# Patient Record
Sex: FEMALE | Race: WHITE | URBAN - METROPOLITAN AREA
[De-identification: names, ages, dates, MRNs, and addresses within clinical notes are randomized per-mention and may not be internally consistent; named-entity substitution may affect disease eponyms.]

---

## 2021-03-12 ENCOUNTER — LAB OUTSIDE AN ENCOUNTER (OUTPATIENT)
Dept: URBAN - METROPOLITAN AREA CLINIC 113 | Facility: CLINIC | Age: 56
End: 2021-03-12

## 2021-03-12 ENCOUNTER — WEB ENCOUNTER (OUTPATIENT)
Dept: URBAN - METROPOLITAN AREA CLINIC 113 | Facility: CLINIC | Age: 56
End: 2021-03-12

## 2021-03-12 ENCOUNTER — OFFICE VISIT (OUTPATIENT)
Dept: URBAN - METROPOLITAN AREA CLINIC 113 | Facility: CLINIC | Age: 56
End: 2021-03-12
Payer: COMMERCIAL

## 2021-03-12 ENCOUNTER — TELEPHONE ENCOUNTER (OUTPATIENT)
Dept: URBAN - METROPOLITAN AREA CLINIC 113 | Facility: CLINIC | Age: 56
End: 2021-03-12

## 2021-03-12 VITALS
RESPIRATION RATE: 20 BRPM | WEIGHT: 169 LBS | DIASTOLIC BLOOD PRESSURE: 72 MMHG | TEMPERATURE: 96.9 F | HEIGHT: 72 IN | HEART RATE: 64 BPM | BODY MASS INDEX: 22.89 KG/M2 | SYSTOLIC BLOOD PRESSURE: 107 MMHG

## 2021-03-12 DIAGNOSIS — K58.1 IRRITABLE BOWEL SYNDROME WITH CONSTIPATION: ICD-10-CM

## 2021-03-12 DIAGNOSIS — K59.01 SLOW TRANSIT CONSTIPATION: ICD-10-CM

## 2021-03-12 DIAGNOSIS — Z86.010 PERSONAL HISTORY OF COLONIC POLYPS: ICD-10-CM

## 2021-03-12 DIAGNOSIS — K21.9 GASTROESOPHAGEAL REFLUX DISEASE WITHOUT ESOPHAGITIS: ICD-10-CM

## 2021-03-12 DIAGNOSIS — Z80.0 FAMILY HISTORY OF COLON CANCER IN MOTHER: ICD-10-CM

## 2021-03-12 PROCEDURE — 99204 OFFICE O/P NEW MOD 45 MIN: CPT | Performed by: INTERNAL MEDICINE

## 2021-03-12 RX ORDER — MIRABEGRON 50 MG/1
1 TABLET TABLET, FILM COATED, EXTENDED RELEASE ORAL ONCE A DAY
Status: ACTIVE | COMMUNITY

## 2021-03-12 RX ORDER — PANTOPRAZOLE SODIUM 40 MG/1
1 TABLET TABLET, DELAYED RELEASE ORAL ONCE A DAY
Qty: 90 | Refills: 3 | OUTPATIENT
Start: 2021-03-12

## 2021-03-12 RX ORDER — VIT A/VIT C/VIT E/ZINC/COPPER 4296-226
AS DIRECTED CAPSULE ORAL TWICE DAILY
Status: ACTIVE | COMMUNITY

## 2021-03-12 RX ORDER — LORAZEPAM 2 MG/1
1 TABLET AT BEDTIME AS NEEDED TABLET ORAL ONCE A DAY
Status: ACTIVE | COMMUNITY

## 2021-03-12 RX ORDER — TRAZODONE HYDROCHLORIDE 100 MG/1
1 TABLET AT BEDTIME TABLET, FILM COATED ORAL ONCE A DAY
Status: ACTIVE | COMMUNITY

## 2021-03-12 RX ORDER — GLUCOSAMINE/CHONDR SU A SOD 750-600 MG
1 CAPSULE TABLET ORAL ONCE A DAY
Status: ACTIVE | COMMUNITY

## 2021-03-12 RX ORDER — VITAMIN A 10000 UNIT
1 TABLET TABLET ORAL ONCE A DAY
Status: ACTIVE | COMMUNITY

## 2021-03-12 NOTE — HPI-OTHER HISTORIES
55-year-old female presents with a history of gastroesophageal reflux disease and significant constipation.  She goes 7 days without having a bowel movement.  She has significant abdominal bloating.  She has been seen by multiple gastroenterologists in [place] and in our area.  Previously she has seen Dr. James.  She also saw Dr. Mcmahan and also saw Dr. Phillips.  She has a known history of anxiety.  She also has a history of esophageal spasm.  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  She has interstitial cystitis.  She does not have diarrhea symptoms.  On 2 different occasions in the past celiac testing was negative serologically.  She has intermittent chest pain.  At times she has to go to the ER.  One episode of severe abdominal pain occurred about one and a half years ago.  She had a CT scan at the [facility] and was told that she had diverticulitis.  Last colonoscopy was 2-1/2 years ago.  Prior colonoscopies were notable for adenomatous polyps.  There is no history of significant nausea or vomiting.  No rectal bleeding at this time.  No melena.  She does not have significant dysphagia.  She does have periods of esophageal chest pain.  There is no pre-existing history of liver disease.  Currently she is on MiraLAX and taking pantoprazole.  With pantoprazole she states that her gastroesophageal reflux disease symptoms are now much improved.  Previously she had consider the option of antireflux surgery.  This is when she saw Dr. Mcmahan.

## 2021-03-22 ENCOUNTER — TELEPHONE ENCOUNTER (OUTPATIENT)
Dept: URBAN - METROPOLITAN AREA CLINIC 113 | Facility: CLINIC | Age: 56
End: 2021-03-22

## 2021-03-22 RX ORDER — LINACLOTIDE 145 UG/1
1 CAPSULE AT LEAST 30 MINUTES BEFORE THE FIRST MEAL OF THE DAY ON AN EMPTY STOMACH CAPSULE, GELATIN COATED ORAL ONCE A DAY
Qty: 90 | Refills: 3 | OUTPATIENT
Start: 2021-03-22 | End: 2022-03-17

## 2021-03-31 ENCOUNTER — OFFICE VISIT (OUTPATIENT)
Dept: URBAN - METROPOLITAN AREA MEDICAL CENTER 19 | Facility: MEDICAL CENTER | Age: 56
End: 2021-03-31
Payer: COMMERCIAL

## 2021-03-31 ENCOUNTER — TELEPHONE ENCOUNTER (OUTPATIENT)
Dept: URBAN - METROPOLITAN AREA CLINIC 113 | Facility: CLINIC | Age: 56
End: 2021-03-31

## 2021-03-31 DIAGNOSIS — R07.89 ACUTE CHEST WALL PAIN: ICD-10-CM

## 2021-03-31 DIAGNOSIS — B96.81 H. PYLORI INFECTION: ICD-10-CM

## 2021-03-31 DIAGNOSIS — K29.50 ANTRAL GASTRITIS: ICD-10-CM

## 2021-03-31 DIAGNOSIS — K44.9 DIAPHRAGMATIC HERNIA: ICD-10-CM

## 2021-03-31 PROCEDURE — 43239 EGD BIOPSY SINGLE/MULTIPLE: CPT | Performed by: INTERNAL MEDICINE

## 2021-03-31 RX ORDER — FAMOTIDINE 40 MG/1
1 TABLET AT BEDTIME TABLET, FILM COATED ORAL ONCE A DAY
Qty: 90 | Refills: 3 | OUTPATIENT
Start: 2021-03-31

## 2021-03-31 RX ORDER — MIRABEGRON 50 MG/1
1 TABLET TABLET, FILM COATED, EXTENDED RELEASE ORAL ONCE A DAY
Status: ACTIVE | COMMUNITY

## 2021-03-31 RX ORDER — LORAZEPAM 2 MG/1
1 TABLET AT BEDTIME AS NEEDED TABLET ORAL ONCE A DAY
Status: ACTIVE | COMMUNITY

## 2021-03-31 RX ORDER — LINACLOTIDE 145 UG/1
1 CAPSULE AT LEAST 30 MINUTES BEFORE THE FIRST MEAL OF THE DAY ON AN EMPTY STOMACH CAPSULE, GELATIN COATED ORAL ONCE A DAY
Qty: 90 | Refills: 3 | Status: ACTIVE | COMMUNITY
Start: 2021-03-22 | End: 2022-03-17

## 2021-03-31 RX ORDER — TRAZODONE HYDROCHLORIDE 100 MG/1
1 TABLET AT BEDTIME TABLET, FILM COATED ORAL ONCE A DAY
Status: ACTIVE | COMMUNITY

## 2021-03-31 RX ORDER — GLUCOSAMINE/CHONDR SU A SOD 750-600 MG
1 CAPSULE TABLET ORAL ONCE A DAY
Status: ACTIVE | COMMUNITY

## 2021-03-31 RX ORDER — PANTOPRAZOLE SODIUM 40 MG/1
1 TABLET TABLET, DELAYED RELEASE ORAL ONCE A DAY
Qty: 90 | Refills: 3 | Status: ACTIVE | COMMUNITY
Start: 2021-03-12

## 2021-03-31 RX ORDER — VIT A/VIT C/VIT E/ZINC/COPPER 4296-226
AS DIRECTED CAPSULE ORAL TWICE DAILY
Status: ACTIVE | COMMUNITY

## 2021-03-31 RX ORDER — VITAMIN A 10000 UNIT
1 TABLET TABLET ORAL ONCE A DAY
Status: ACTIVE | COMMUNITY

## 2021-04-01 ENCOUNTER — OFFICE VISIT (OUTPATIENT)
Dept: URBAN - METROPOLITAN AREA SURGERY CENTER 25 | Facility: SURGERY CENTER | Age: 56
End: 2021-04-01

## 2021-04-07 ENCOUNTER — TELEPHONE ENCOUNTER (OUTPATIENT)
Dept: URBAN - METROPOLITAN AREA CLINIC 113 | Facility: CLINIC | Age: 56
End: 2021-04-07

## 2021-04-07 RX ORDER — LINACLOTIDE 145 UG/1
1 CAPSULE AT LEAST 30 MINUTES BEFORE THE FIRST MEAL OF THE DAY ON AN EMPTY STOMACH CAPSULE, GELATIN COATED ORAL ONCE A DAY
Qty: 90 | Refills: 3 | Status: ACTIVE | COMMUNITY
Start: 2021-03-22 | End: 2022-03-17

## 2021-04-07 RX ORDER — VITAMIN A 10000 UNIT
1 TABLET TABLET ORAL ONCE A DAY
Status: ACTIVE | COMMUNITY

## 2021-04-07 RX ORDER — AMOXICILLIN 500 MG/1
2 CAPSULES TABLET, FILM COATED ORAL TWICE A DAY
Qty: 56 CAPSULE | Refills: 0 | OUTPATIENT
Start: 2021-04-07 | End: 2021-04-21

## 2021-04-07 RX ORDER — MIRABEGRON 50 MG/1
1 TABLET TABLET, FILM COATED, EXTENDED RELEASE ORAL ONCE A DAY
Status: ACTIVE | COMMUNITY

## 2021-04-07 RX ORDER — LORAZEPAM 2 MG/1
1 TABLET AT BEDTIME AS NEEDED TABLET ORAL ONCE A DAY
Status: ACTIVE | COMMUNITY

## 2021-04-07 RX ORDER — TRAZODONE HYDROCHLORIDE 100 MG/1
1 TABLET AT BEDTIME TABLET, FILM COATED ORAL ONCE A DAY
Status: ACTIVE | COMMUNITY

## 2021-04-07 RX ORDER — VIT A/VIT C/VIT E/ZINC/COPPER 4296-226
AS DIRECTED CAPSULE ORAL TWICE DAILY
Status: ACTIVE | COMMUNITY

## 2021-04-07 RX ORDER — CLARITHROMYCIN 500 MG/1
1 TABLET TABLET, FILM COATED ORAL
Qty: 28 TABLET | Refills: 0 | OUTPATIENT
Start: 2021-04-07 | End: 2021-04-21

## 2021-04-07 RX ORDER — GLUCOSAMINE/CHONDR SU A SOD 750-600 MG
1 CAPSULE TABLET ORAL ONCE A DAY
Status: ACTIVE | COMMUNITY

## 2021-04-07 RX ORDER — PANTOPRAZOLE SODIUM 40 MG/1
1 TABLET TABLET, DELAYED RELEASE ORAL ONCE A DAY
Qty: 90 | Refills: 3 | Status: ACTIVE | COMMUNITY
Start: 2021-03-12

## 2021-04-07 RX ORDER — FAMOTIDINE 40 MG/1
1 TABLET AT BEDTIME TABLET, FILM COATED ORAL ONCE A DAY
Qty: 90 | Refills: 3 | Status: ACTIVE | COMMUNITY
Start: 2021-03-31

## 2021-04-07 RX ORDER — PANTOPRAZOLE SODIUM 40 MG/1
1 TABLET TABLET, DELAYED RELEASE ORAL TWICE DAILY
Qty: 60 | Refills: 6 | OUTPATIENT
Start: 2021-04-07

## 2021-05-07 ENCOUNTER — TELEPHONE ENCOUNTER (OUTPATIENT)
Dept: URBAN - METROPOLITAN AREA CLINIC 113 | Facility: CLINIC | Age: 56
End: 2021-05-07

## 2021-05-14 ENCOUNTER — LAB OUTSIDE AN ENCOUNTER (OUTPATIENT)
Dept: URBAN - METROPOLITAN AREA CLINIC 113 | Facility: CLINIC | Age: 56
End: 2021-05-14

## 2021-05-14 ENCOUNTER — OFFICE VISIT (OUTPATIENT)
Dept: URBAN - METROPOLITAN AREA CLINIC 113 | Facility: CLINIC | Age: 56
End: 2021-05-14
Payer: COMMERCIAL

## 2021-05-14 ENCOUNTER — WEB ENCOUNTER (OUTPATIENT)
Dept: URBAN - METROPOLITAN AREA CLINIC 113 | Facility: CLINIC | Age: 56
End: 2021-05-14

## 2021-05-14 VITALS
TEMPERATURE: 97.1 F | HEART RATE: 66 BPM | BODY MASS INDEX: 21.59 KG/M2 | HEIGHT: 72 IN | DIASTOLIC BLOOD PRESSURE: 73 MMHG | RESPIRATION RATE: 20 BRPM | WEIGHT: 159.4 LBS | SYSTOLIC BLOOD PRESSURE: 123 MMHG

## 2021-05-14 DIAGNOSIS — K59.01 SLOW TRANSIT CONSTIPATION: ICD-10-CM

## 2021-05-14 DIAGNOSIS — A04.8 HELICOBACTER PYLORI (H. PYLORI): ICD-10-CM

## 2021-05-14 DIAGNOSIS — Z86.010 PERSONAL HISTORY OF COLONIC POLYPS: ICD-10-CM

## 2021-05-14 DIAGNOSIS — E61.1 IRON DEFICIENCY: ICD-10-CM

## 2021-05-14 DIAGNOSIS — K21.9 GASTROESOPHAGEAL REFLUX DISEASE WITHOUT ESOPHAGITIS: ICD-10-CM

## 2021-05-14 DIAGNOSIS — K22.4 ESOPHAGEAL DYSMOTILITY: ICD-10-CM

## 2021-05-14 DIAGNOSIS — R10.12 LEFT UPPER QUADRANT ABDOMINAL PAIN: ICD-10-CM

## 2021-05-14 DIAGNOSIS — R10.32 LEFT LOWER QUADRANT ABDOMINAL PAIN: ICD-10-CM

## 2021-05-14 DIAGNOSIS — Z80.0 FAMILY HISTORY OF COLON CANCER IN MOTHER: ICD-10-CM

## 2021-05-14 DIAGNOSIS — K58.1 IRRITABLE BOWEL SYNDROME WITH CONSTIPATION: ICD-10-CM

## 2021-05-14 PROCEDURE — 99214 OFFICE O/P EST MOD 30 MIN: CPT | Performed by: INTERNAL MEDICINE

## 2021-05-14 RX ORDER — VIT A/VIT C/VIT E/ZINC/COPPER 4296-226
AS DIRECTED CAPSULE ORAL TWICE DAILY
Status: ACTIVE | COMMUNITY

## 2021-05-14 RX ORDER — GLUCOSAMINE/CHONDR SU A SOD 750-600 MG
1 CAPSULE TABLET ORAL ONCE A DAY
Status: ACTIVE | COMMUNITY

## 2021-05-14 RX ORDER — TRAZODONE HYDROCHLORIDE 100 MG/1
1 TABLET AT BEDTIME TABLET, FILM COATED ORAL ONCE A DAY
Status: ACTIVE | COMMUNITY

## 2021-05-14 RX ORDER — PANTOPRAZOLE SODIUM 40 MG/1
1 TABLET TABLET, DELAYED RELEASE ORAL ONCE A DAY
Qty: 90 | Refills: 3 | Status: ACTIVE | COMMUNITY
Start: 2021-03-12

## 2021-05-14 RX ORDER — LORAZEPAM 2 MG/1
1 TABLET AT BEDTIME AS NEEDED TABLET ORAL ONCE A DAY
Status: ACTIVE | COMMUNITY

## 2021-05-14 RX ORDER — LINACLOTIDE 145 UG/1
1 CAPSULE AT LEAST 30 MINUTES BEFORE THE FIRST MEAL OF THE DAY ON AN EMPTY STOMACH CAPSULE, GELATIN COATED ORAL ONCE A DAY
Qty: 90 | Refills: 3 | Status: ACTIVE | COMMUNITY
Start: 2021-03-22 | End: 2022-03-17

## 2021-05-14 RX ORDER — MIRABEGRON 50 MG/1
1 TABLET TABLET, FILM COATED, EXTENDED RELEASE ORAL ONCE A DAY
Status: ACTIVE | COMMUNITY

## 2021-05-14 RX ORDER — VITAMIN A 10000 UNIT
1 TABLET TABLET ORAL ONCE A DAY
Status: ACTIVE | COMMUNITY

## 2021-05-14 RX ORDER — PANTOPRAZOLE SODIUM 40 MG/1
1 TABLET TABLET, DELAYED RELEASE ORAL TWICE DAILY
Qty: 60 | Refills: 6 | Status: ACTIVE | COMMUNITY
Start: 2021-04-07

## 2021-05-14 RX ORDER — FAMOTIDINE 40 MG/1
1 TABLET AT BEDTIME TABLET, FILM COATED ORAL ONCE A DAY
Qty: 90 | Refills: 3 | Status: ACTIVE | COMMUNITY
Start: 2021-03-31

## 2021-05-14 NOTE — HPI-OTHER HISTORIES
55-year-old female presents with a history of gastroesophageal reflux disease and significant constipation.  She has been seen by multiple gastroenterologists in [place] and in our area.  Previously she has seen Dr. James.  She also saw Dr. Mcmahan and also saw Dr. Phillips.  She has a known history of anxiety.  She also has a history of esophageal spasm.  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  She has interstitial cystitis.    On 2 different occasions in the past celiac testing was negative serologically.  She has intermittent chest pain.  At times she has to go to the ER.  One episode of severe abdominal pain occurred about one and a half years ago.  She had a CT scan at the [facility] and [place] and was told that she had diverticulitis.  Last colonoscopy was 2-1/2 years ago.  Prior colonoscopies were notable for adenomatous polyps.  Previously she had consider the option of antireflux surgery.  This is when she saw Dr. Mcmahan.  . Since her last visit she was treated for H. pylori.  Reflux symptoms are now much better on pantoprazole twice daily and famotidine at bedtime.  Her bowel reflux he is improved on a combination of Linzess daily and MiraLAX every third day.  She tells me that occasionally she uses coffee enemas to control constipation.  She has difficulty with colon preps.  She states that the MiraLAX prep works best for her.  She is not having difficulty with swallowing at this time.  He is interested in confirming eradication of H. pylori.  Apparently she was treated for H. pylori 20 years ago. . EGD March 31st 2021.  Small hiatal hernia and patulous lower esophageal sphincter.  Mild nonerosive gastritis.  Biopsies were positive for H. pylori. . Barium swallow March 2021.  Consistent with esophageal dysmotility. . EGD 20 years ago.  Reportedly notable for H. pylori on biopsies.  She was treated with quadruple therapy at that time

## 2021-05-28 ENCOUNTER — TELEPHONE ENCOUNTER (OUTPATIENT)
Dept: URBAN - METROPOLITAN AREA CLINIC 113 | Facility: CLINIC | Age: 56
End: 2021-05-28

## 2021-06-03 ENCOUNTER — OFFICE VISIT (OUTPATIENT)
Dept: URBAN - METROPOLITAN AREA CLINIC 113 | Facility: CLINIC | Age: 56
End: 2021-06-03

## 2021-06-03 ENCOUNTER — TELEPHONE ENCOUNTER (OUTPATIENT)
Dept: URBAN - METROPOLITAN AREA CLINIC 113 | Facility: CLINIC | Age: 56
End: 2021-06-03

## 2021-06-03 RX ORDER — SUCRALFATE 1 G/10ML
10 ML - 20 ML SUSPENSION ORAL
Qty: 1000 ML | Refills: 11 | OUTPATIENT
Start: 2021-06-03 | End: 2022-05-29

## 2021-06-19 LAB
H PYLORI BREATH TEST: NOT DETECTED
H. PYLORI BREATH TEST: NOT DETECTED
INTERPRETATION: NOT DETECTED

## 2021-06-24 ENCOUNTER — OFFICE VISIT (OUTPATIENT)
Dept: URBAN - METROPOLITAN AREA CLINIC 113 | Facility: CLINIC | Age: 56
End: 2021-06-24
Payer: COMMERCIAL

## 2021-06-24 VITALS
HEIGHT: 72 IN | SYSTOLIC BLOOD PRESSURE: 120 MMHG | TEMPERATURE: 97.7 F | BODY MASS INDEX: 22.08 KG/M2 | WEIGHT: 163 LBS | DIASTOLIC BLOOD PRESSURE: 78 MMHG | HEART RATE: 62 BPM

## 2021-06-24 DIAGNOSIS — K58.1 IRRITABLE BOWEL SYNDROME WITH CONSTIPATION: ICD-10-CM

## 2021-06-24 DIAGNOSIS — R10.12 LEFT UPPER QUADRANT ABDOMINAL PAIN: ICD-10-CM

## 2021-06-24 DIAGNOSIS — A04.8 HELICOBACTER PYLORI (H. PYLORI): ICD-10-CM

## 2021-06-24 DIAGNOSIS — Z86.010 PERSONAL HISTORY OF COLONIC POLYPS: ICD-10-CM

## 2021-06-24 DIAGNOSIS — E61.1 IRON DEFICIENCY: ICD-10-CM

## 2021-06-24 DIAGNOSIS — Z80.0 FAMILY HISTORY OF COLON CANCER IN MOTHER: ICD-10-CM

## 2021-06-24 DIAGNOSIS — K22.4 ESOPHAGEAL DYSMOTILITY: ICD-10-CM

## 2021-06-24 DIAGNOSIS — K59.01 SLOW TRANSIT CONSTIPATION: ICD-10-CM

## 2021-06-24 DIAGNOSIS — K21.9 GASTROESOPHAGEAL REFLUX DISEASE WITHOUT ESOPHAGITIS: ICD-10-CM

## 2021-06-24 DIAGNOSIS — R10.32 LEFT LOWER QUADRANT ABDOMINAL PAIN: ICD-10-CM

## 2021-06-24 PROCEDURE — 99214 OFFICE O/P EST MOD 30 MIN: CPT | Performed by: INTERNAL MEDICINE

## 2021-06-24 RX ORDER — TRAZODONE HYDROCHLORIDE 100 MG/1
1 TABLET AT BEDTIME TABLET, FILM COATED ORAL ONCE A DAY
Status: ACTIVE | COMMUNITY

## 2021-06-24 RX ORDER — PANTOPRAZOLE SODIUM 40 MG/1
1 TABLET TABLET, DELAYED RELEASE ORAL ONCE A DAY
Qty: 90 | Refills: 3 | Status: ON HOLD | COMMUNITY
Start: 2021-03-12

## 2021-06-24 RX ORDER — SUCRALFATE 1 G/10ML
10 ML - 20 ML SUSPENSION ORAL
Qty: 1000 ML | Refills: 11 | Status: ON HOLD | COMMUNITY
Start: 2021-06-03 | End: 2022-05-29

## 2021-06-24 RX ORDER — FAMOTIDINE 40 MG/1
1 TABLET AT BEDTIME TABLET, FILM COATED ORAL ONCE A DAY
Qty: 90 | Refills: 3 | Status: ACTIVE | COMMUNITY
Start: 2021-03-31

## 2021-06-24 RX ORDER — VIT A/VIT C/VIT E/ZINC/COPPER 4296-226
AS DIRECTED CAPSULE ORAL TWICE DAILY
Status: ACTIVE | COMMUNITY

## 2021-06-24 RX ORDER — MIRABEGRON 50 MG/1
1 TABLET TABLET, FILM COATED, EXTENDED RELEASE ORAL ONCE A DAY
Status: ON HOLD | COMMUNITY

## 2021-06-24 RX ORDER — LINACLOTIDE 145 UG/1
1 CAPSULE AT LEAST 30 MINUTES BEFORE THE FIRST MEAL OF THE DAY ON AN EMPTY STOMACH CAPSULE, GELATIN COATED ORAL ONCE A DAY
Qty: 90 | Refills: 3 | Status: ACTIVE | COMMUNITY
Start: 2021-03-22 | End: 2022-03-17

## 2021-06-24 RX ORDER — PANTOPRAZOLE SODIUM 40 MG/1
1 TABLET TABLET, DELAYED RELEASE ORAL TWICE DAILY
Qty: 60 | Refills: 6 | Status: ON HOLD | COMMUNITY
Start: 2021-04-07

## 2021-06-24 RX ORDER — LORAZEPAM 2 MG/1
1 TABLET AT BEDTIME AS NEEDED TABLET ORAL ONCE A DAY
Status: ACTIVE | COMMUNITY

## 2021-06-24 RX ORDER — VITAMIN A 10000 UNIT
1 TABLET TABLET ORAL ONCE A DAY
Status: ACTIVE | COMMUNITY

## 2021-06-24 RX ORDER — GLUCOSAMINE/CHONDR SU A SOD 750-600 MG
1 CAPSULE TABLET ORAL ONCE A DAY
Status: ACTIVE | COMMUNITY

## 2021-06-24 NOTE — HPI-OTHER HISTORIES
55-year-old female presents with a history of gastroesophageal reflux disease and significant constipation.  She has been seen by multiple gastroenterologists in [place] and in our area.  Previously she has seen Dr. James.  She also saw Dr. Mcmahan and also saw Dr. Phillips.  She has a known history of anxiety.  She also has a history of esophageal spasm.  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  She has interstitial cystitis.    She has intermittent chest pain.  At times she has to go to the ER.  One episode of severe abdominal pain occurred about one and a half years ago.  She had a CT scan at the [facility] and was told that she had diverticulitis.  Last colonoscopy was 2-1/2 years ago.  Prior colonoscopies were notable for adenomatous polyps.  Previously she had consider the option of antireflux surgery.  This is when she saw Dr. Mcmahan.  . Long discussion with patient today after functional GI issues.  . She is not having difficulty with swallowing at this time.  He is interested in confirming eradication of H. pylori.  Apparently she was treated for H. pylori 20 years ago.  . HP breath Test May 2021 negative . CT Abd and Pelvis May 2021 Normal.  . EGD March 31st 2021.  Small hiatal hernia and patulous lower esophageal sphincter.  Mild nonerosive gastritis.  Biopsies were positive for H. pylori. . Barium swallow March 2021.  Consistent with esophageal dysmotility. . EGD 20 years ago.  Reportedly notable for H. pylori on biopsies.  She was treated with quadruple therapy at that time  . On 2 different occasions in the past celiac testing was negative serologically.   . Esophageal manometry 2017: Reportedly Negative for spasm.

## 2021-07-20 ENCOUNTER — OFFICE VISIT (OUTPATIENT)
Dept: URBAN - METROPOLITAN AREA CLINIC 113 | Facility: CLINIC | Age: 56
End: 2021-07-20

## 2021-07-23 ENCOUNTER — OFFICE VISIT (OUTPATIENT)
Dept: URBAN - METROPOLITAN AREA CLINIC 113 | Facility: CLINIC | Age: 56
End: 2021-07-23
Payer: COMMERCIAL

## 2021-07-23 VITALS
BODY MASS INDEX: 22.48 KG/M2 | HEART RATE: 61 BPM | DIASTOLIC BLOOD PRESSURE: 78 MMHG | TEMPERATURE: 96.9 F | HEIGHT: 72 IN | RESPIRATION RATE: 20 BRPM | SYSTOLIC BLOOD PRESSURE: 128 MMHG | WEIGHT: 166 LBS

## 2021-07-23 DIAGNOSIS — K22.4 ESOPHAGEAL DYSMOTILITY: ICD-10-CM

## 2021-07-23 DIAGNOSIS — R10.32 LEFT LOWER QUADRANT ABDOMINAL PAIN: ICD-10-CM

## 2021-07-23 DIAGNOSIS — K58.1 IRRITABLE BOWEL SYNDROME WITH CONSTIPATION: ICD-10-CM

## 2021-07-23 DIAGNOSIS — Z86.010 PERSONAL HISTORY OF COLONIC POLYPS: ICD-10-CM

## 2021-07-23 DIAGNOSIS — E61.1 IRON DEFICIENCY: ICD-10-CM

## 2021-07-23 DIAGNOSIS — R10.12 LEFT UPPER QUADRANT ABDOMINAL PAIN: ICD-10-CM

## 2021-07-23 DIAGNOSIS — A04.8 HELICOBACTER PYLORI (H. PYLORI): ICD-10-CM

## 2021-07-23 DIAGNOSIS — K21.9 GASTROESOPHAGEAL REFLUX DISEASE WITHOUT ESOPHAGITIS: ICD-10-CM

## 2021-07-23 DIAGNOSIS — K59.01 SLOW TRANSIT CONSTIPATION: ICD-10-CM

## 2021-07-23 DIAGNOSIS — Z80.0 FAMILY HISTORY OF COLON CANCER IN MOTHER: ICD-10-CM

## 2021-07-23 PROCEDURE — 99214 OFFICE O/P EST MOD 30 MIN: CPT | Performed by: INTERNAL MEDICINE

## 2021-07-23 RX ORDER — VIT A/VIT C/VIT E/ZINC/COPPER 4296-226
AS DIRECTED CAPSULE ORAL TWICE DAILY
Status: ACTIVE | COMMUNITY

## 2021-07-23 RX ORDER — SUCRALFATE 1 G/10ML
10 ML - 20 ML SUSPENSION ORAL
Qty: 1000 ML | Refills: 11 | Status: ON HOLD | COMMUNITY
Start: 2021-06-03 | End: 2022-05-29

## 2021-07-23 RX ORDER — LORAZEPAM 2 MG/1
1 TABLET AT BEDTIME AS NEEDED TABLET ORAL ONCE A DAY
Status: ACTIVE | COMMUNITY

## 2021-07-23 RX ORDER — TRAZODONE HYDROCHLORIDE 100 MG/1
1 TABLET AT BEDTIME TABLET, FILM COATED ORAL ONCE A DAY
Status: ACTIVE | COMMUNITY

## 2021-07-23 RX ORDER — FAMOTIDINE 40 MG/1
1 TABLET AT BEDTIME TABLET, FILM COATED ORAL ONCE A DAY
Qty: 90 | Refills: 3 | Status: ACTIVE | COMMUNITY
Start: 2021-03-31

## 2021-07-23 RX ORDER — PANTOPRAZOLE SODIUM 40 MG/1
1 TABLET TABLET, DELAYED RELEASE ORAL ONCE A DAY
Qty: 90 | Refills: 3 | Status: ON HOLD | COMMUNITY
Start: 2021-03-12

## 2021-07-23 RX ORDER — LINACLOTIDE 145 UG/1
1 CAPSULE AT LEAST 30 MINUTES BEFORE THE FIRST MEAL OF THE DAY ON AN EMPTY STOMACH CAPSULE, GELATIN COATED ORAL ONCE A DAY
Qty: 90 | Refills: 3 | Status: ACTIVE | COMMUNITY
Start: 2021-03-22 | End: 2022-03-17

## 2021-07-23 RX ORDER — PANTOPRAZOLE SODIUM 40 MG/1
1 TABLET TABLET, DELAYED RELEASE ORAL TWICE DAILY
Qty: 60 | Refills: 6 | Status: ON HOLD | COMMUNITY
Start: 2021-04-07

## 2021-07-23 RX ORDER — MIRABEGRON 50 MG/1
1 TABLET TABLET, FILM COATED, EXTENDED RELEASE ORAL ONCE A DAY
Status: ON HOLD | COMMUNITY

## 2021-07-23 RX ORDER — VITAMIN A 10000 UNIT
1 TABLET TABLET ORAL ONCE A DAY
Status: ACTIVE | COMMUNITY

## 2021-07-23 RX ORDER — GLUCOSAMINE/CHONDR SU A SOD 750-600 MG
1 CAPSULE TABLET ORAL ONCE A DAY
Status: ACTIVE | COMMUNITY

## 2021-07-23 NOTE — PHYSICAL EXAM EXTREMITIES:
no clubbing, cyanosis, or edema Mohs Rapid Report Verbiage: The area of clinically evident tumor was marked with skin marking ink and appropriately hatched.  The initial incision was made following the Mohs approach through the skin.  The specimen was taken to the lab, divided into the necessary number of pieces, chromacoded and processed according to the Mohs protocol.  This was repeated in successive stages until a tumor free defect was achieved.

## 2021-07-23 NOTE — HPI-OTHER HISTORIES
55-year-old female presents with a history of gastroesophageal reflux disease and significant constipation.  She has been seen by multiple gastroenterologists in [place] and in our area.  Previously she has seen Dr. James.  She also saw Dr. Mcmahan and also saw Dr. Phillips.  She has a known history of anxiety.  She also has a history of esophageal spasm.  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  She has interstitial cystitis.    She has intermittent chest pain.  At times she has to go to the ER.  One episode of severe abdominal pain occurred about one and a half years ago.  She had [facility] CT [facility] at [facility] and was told that she had diverticulitis.  Last colonoscopy was 2-1/2 years ago.  Prior colonoscopies were notable for adenomatous polyps.  Previously she had consider the option of antireflux surgery.  This is when she saw Dr. Mcmahan.  . Long discussion with patient today after functional GI disorders.  was with her for today's discussion.  She has significant visceral hypersensitivity.  Symptoms seem to be much better at this stage.  Less anxiety. . She is not having difficulty with swallowing at this time.  He is interested in confirming eradication of H. pylori.  Apparently she was treated for H. pylori 20 years ago.  . CT Abd and Pelvis with contrast June 2021.  . HP breath Test May 2021 negative . CT Abd and Pelvis May 2021 Normal.  . EGD March 31st 2021.  Small hiatal hernia and patulous lower esophageal sphincter.  Mild nonerosive gastritis.  Biopsies were positive for H. pylori. . Barium swallow March 2021.  Consistent with esophageal dysmotility. . EGD 20 years ago.  Reportedly notable for H. pylori on biopsies.  She was treated with quadruple therapy at that time  . On 2 different occasions in the past celiac testing was negative serologically.   . Esophageal manometry 2017: Reportedly Negative for spasm. But then she states she had Nutcracker esophagus.

## 2021-12-03 ENCOUNTER — TELEPHONE ENCOUNTER (OUTPATIENT)
Dept: URBAN - METROPOLITAN AREA CLINIC 6 | Facility: CLINIC | Age: 56
End: 2021-12-03

## 2021-12-21 ENCOUNTER — LAB OUTSIDE AN ENCOUNTER (OUTPATIENT)
Dept: URBAN - METROPOLITAN AREA CLINIC 113 | Facility: CLINIC | Age: 56
End: 2021-12-21

## 2021-12-27 ENCOUNTER — TELEPHONE ENCOUNTER (OUTPATIENT)
Dept: URBAN - METROPOLITAN AREA CLINIC 113 | Facility: CLINIC | Age: 56
End: 2021-12-27

## 2021-12-27 ENCOUNTER — CLAIMS CREATED FROM THE CLAIM WINDOW (OUTPATIENT)
Dept: URBAN - METROPOLITAN AREA CLINIC 113 | Facility: CLINIC | Age: 56
End: 2021-12-27
Payer: COMMERCIAL

## 2021-12-27 ENCOUNTER — LAB OUTSIDE AN ENCOUNTER (OUTPATIENT)
Dept: URBAN - METROPOLITAN AREA CLINIC 113 | Facility: CLINIC | Age: 56
End: 2021-12-27

## 2021-12-27 VITALS
BODY MASS INDEX: 21.81 KG/M2 | TEMPERATURE: 97.3 F | SYSTOLIC BLOOD PRESSURE: 115 MMHG | HEART RATE: 63 BPM | WEIGHT: 161 LBS | HEIGHT: 72 IN | DIASTOLIC BLOOD PRESSURE: 80 MMHG

## 2021-12-27 DIAGNOSIS — E61.1 IRON DEFICIENCY: ICD-10-CM

## 2021-12-27 DIAGNOSIS — K59.01 SLOW TRANSIT CONSTIPATION: ICD-10-CM

## 2021-12-27 DIAGNOSIS — A04.8 HELICOBACTER PYLORI (H. PYLORI): ICD-10-CM

## 2021-12-27 DIAGNOSIS — K58.1 IRRITABLE BOWEL SYNDROME WITH CONSTIPATION: ICD-10-CM

## 2021-12-27 DIAGNOSIS — Z86.010 PERSONAL HISTORY OF COLONIC POLYPS: ICD-10-CM

## 2021-12-27 DIAGNOSIS — R10.12 LEFT UPPER QUADRANT ABDOMINAL PAIN: ICD-10-CM

## 2021-12-27 DIAGNOSIS — R10.11 RIGHT UPPER QUADRANT PAIN: ICD-10-CM

## 2021-12-27 DIAGNOSIS — K22.4 ESOPHAGEAL DYSMOTILITY: ICD-10-CM

## 2021-12-27 DIAGNOSIS — K30 FUNCTIONAL DYSPEPSIA: ICD-10-CM

## 2021-12-27 DIAGNOSIS — K21.9 GASTROESOPHAGEAL REFLUX DISEASE WITHOUT ESOPHAGITIS: ICD-10-CM

## 2021-12-27 DIAGNOSIS — R10.32 LEFT LOWER QUADRANT ABDOMINAL PAIN: ICD-10-CM

## 2021-12-27 DIAGNOSIS — Z80.0 FAMILY HISTORY OF COLON CANCER IN MOTHER: ICD-10-CM

## 2021-12-27 PROCEDURE — 99214 OFFICE O/P EST MOD 30 MIN: CPT | Performed by: INTERNAL MEDICINE

## 2021-12-27 RX ORDER — SUCRALFATE 1 G/10ML
10 ML - 20 ML SUSPENSION ORAL
Qty: 1000 ML | Refills: 11 | Status: ON HOLD | COMMUNITY
Start: 2021-06-03 | End: 2022-05-29

## 2021-12-27 RX ORDER — VIT A/VIT C/VIT E/ZINC/COPPER 4296-226
AS DIRECTED CAPSULE ORAL TWICE DAILY
Status: ACTIVE | COMMUNITY

## 2021-12-27 RX ORDER — LORAZEPAM 2 MG/1
1 TABLET AT BEDTIME AS NEEDED TABLET ORAL ONCE A DAY
Status: ACTIVE | COMMUNITY

## 2021-12-27 RX ORDER — PANTOPRAZOLE SODIUM 40 MG/1
1 TABLET TABLET, DELAYED RELEASE ORAL ONCE A DAY
Qty: 90 | Refills: 3 | OUTPATIENT
Start: 2021-12-27

## 2021-12-27 RX ORDER — LINACLOTIDE 145 UG/1
1 CAPSULE AT LEAST 30 MINUTES BEFORE THE FIRST MEAL OF THE DAY ON AN EMPTY STOMACH CAPSULE, GELATIN COATED ORAL ONCE A DAY
Qty: 90 | Refills: 3 | Status: ACTIVE | COMMUNITY
Start: 2021-03-22 | End: 2022-03-17

## 2021-12-27 RX ORDER — PANTOPRAZOLE SODIUM 40 MG/1
1 TABLET TABLET, DELAYED RELEASE ORAL ONCE A DAY
Qty: 90 | Refills: 3 | Status: ON HOLD | COMMUNITY
Start: 2021-03-12

## 2021-12-27 RX ORDER — MIRABEGRON 50 MG/1
1 TABLET TABLET, FILM COATED, EXTENDED RELEASE ORAL ONCE A DAY
Status: ON HOLD | COMMUNITY

## 2021-12-27 RX ORDER — TRAZODONE HYDROCHLORIDE 100 MG/1
1 TABLET AT BEDTIME TABLET, FILM COATED ORAL ONCE A DAY
Status: ACTIVE | COMMUNITY

## 2021-12-27 RX ORDER — GLUCOSAMINE/CHONDR SU A SOD 750-600 MG
1 CAPSULE TABLET ORAL ONCE A DAY
Status: ACTIVE | COMMUNITY

## 2021-12-27 RX ORDER — LINACLOTIDE 290 UG/1
1 CAPSULE AT LEAST 30 MINUTES BEFORE THE FIRST MEAL OF THE DAY ON AN EMPTY STOMACH CAPSULE, GELATIN COATED ORAL ONCE A DAY
Qty: 90 | Refills: 3 | OUTPATIENT
Start: 2021-12-27 | End: 2022-12-22

## 2021-12-27 RX ORDER — VITAMIN A 10000 UNIT
1 TABLET TABLET ORAL ONCE A DAY
Status: ACTIVE | COMMUNITY

## 2021-12-27 RX ORDER — TRAZODONE HYDROCHLORIDE 50 MG/1
1 TABLET AT BEDTIME TABLET, FILM COATED ORAL ONCE A DAY
Qty: 30 TABLET | Refills: 11 | OUTPATIENT

## 2021-12-27 RX ORDER — HYOSCYAMINE SULFATE 0.12 MG/1
1 TABLET UNDER THE TONGUE AND ALLOW TO DISSOLVE  AS NEEDED FOR ABDOMINAL PAIN TABLET, ORALLY DISINTEGRATING ORAL THREE TIMES A DAY
Qty: 40 | Refills: 4 | OUTPATIENT
Start: 2021-12-27 | End: 2022-05-26

## 2021-12-27 RX ORDER — PANTOPRAZOLE SODIUM 40 MG/1
1 TABLET TABLET, DELAYED RELEASE ORAL TWICE DAILY
Qty: 60 | Refills: 6 | Status: ON HOLD | COMMUNITY
Start: 2021-04-07

## 2021-12-27 RX ORDER — FAMOTIDINE 40 MG/1
1 TABLET AT BEDTIME TABLET, FILM COATED ORAL ONCE A DAY
Qty: 90 | Refills: 3 | Status: ACTIVE | COMMUNITY
Start: 2021-03-31

## 2021-12-27 NOTE — HPI-TODAY'S VISIT:
56-year-old female presents with a history of gastroesophageal reflux disease and significant constipation.   . Unfortunately she has had some recent issues with noncardiac chest pain and went to the ER.  She also has had some epigastric burning.  Continued constipation.  No rectal bleeding.  Anxious.  Stopped taking trazodone.  Dry mouth symptoms.  She is willing to try this again.  She was worried about H. pylori.  Repeat testing was negative. . She has been seen by multiple gastroenterologists in [place] and in our area.  Previously she has seen Dr. James.  She also saw Dr. Mcmahan and also saw Dr. Phillips.  She has a known history of anxiety.  She also has a history of esophageal spasm.  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  She has interstitial cystitis.    She has intermittent chest pain.  At times she has to go to the ER.  One episode of severe abdominal pain occurred about one and a half years ago.  She had [place] CT scan at the [facility] and was told that she had diverticulitis.  Last colonoscopy was 2-1/2 years ago.  Prior colonoscopies were notable for adenomatous polyps.  Previously she had consider the option of antireflux surgery.  This is when she saw Dr. Mcmahan.  . Repeat HP breath test negative 12/2021. . She was seen in the ER in Nov. She was prescribed Prevpak there. She took this in November. (2nd course) . CT Abd and Pelvis with contrast June 2021.  . HP breath Test May 2021 negative . CT Abd and Pelvis May 2021 Normal.  . EGD March 31st 2021.  Small hiatal hernia and patulous lower esophageal sphincter.  Mild nonerosive gastritis.  Biopsies were positive for H. pylori. . Barium swallow March 2021.  Consistent with esophageal dysmotility. . EGD 20 years ago.  Reportedly notable for H. pylori on biopsies.  She was treated with quadruple therapy at that time  . On 2 different occasions in the past celiac testing was negative serologically.   . Esophageal manometry 2017: Reportedly Negative for spasm. But then she states she had Nutcracker esophagus.

## 2022-01-10 ENCOUNTER — TELEPHONE ENCOUNTER (OUTPATIENT)
Dept: URBAN - METROPOLITAN AREA CLINIC 113 | Facility: CLINIC | Age: 57
End: 2022-01-10

## 2022-02-10 ENCOUNTER — TELEPHONE ENCOUNTER (OUTPATIENT)
Dept: URBAN - METROPOLITAN AREA CLINIC 113 | Facility: CLINIC | Age: 57
End: 2022-02-10

## 2022-02-10 RX ORDER — LINACLOTIDE 145 UG/1
1 CAPSULE AT LEAST 30 MINUTES BEFORE THE FIRST MEAL OF THE DAY ON AN EMPTY STOMACH CAPSULE, GELATIN COATED ORAL ONCE A DAY
Qty: 90 | Refills: 3 | OUTPATIENT
Start: 2022-02-10 | End: 2023-02-05

## 2022-03-17 ENCOUNTER — ERX REFILL RESPONSE (OUTPATIENT)
Dept: URBAN - METROPOLITAN AREA CLINIC 113 | Facility: CLINIC | Age: 57
End: 2022-03-17

## 2022-03-17 RX ORDER — LINACLOTIDE 145 UG/1
TAKE 1 CAPSULE DAILY AT LEAST 30 MINUTES BEFORE THE FIRST MEAL OF THE DAY ON AN EMPTY STOMACH CAPSULE, GELATIN COATED ORAL
Qty: 90 CAPSULE | Refills: 4 | OUTPATIENT

## 2022-03-17 RX ORDER — LINACLOTIDE 145 UG/1
1 CAPSULE AT LEAST 30 MINUTES BEFORE THE FIRST MEAL OF THE DAY ON AN EMPTY STOMACH CAPSULE, GELATIN COATED ORAL ONCE A DAY
Qty: 90 | Refills: 3 | OUTPATIENT

## 2022-03-28 ENCOUNTER — WEB ENCOUNTER (OUTPATIENT)
Dept: URBAN - METROPOLITAN AREA CLINIC 113 | Facility: CLINIC | Age: 57
End: 2022-03-28

## 2022-03-28 ENCOUNTER — LAB OUTSIDE AN ENCOUNTER (OUTPATIENT)
Dept: URBAN - METROPOLITAN AREA CLINIC 113 | Facility: CLINIC | Age: 57
End: 2022-03-28

## 2022-03-28 ENCOUNTER — OFFICE VISIT (OUTPATIENT)
Dept: URBAN - METROPOLITAN AREA CLINIC 113 | Facility: CLINIC | Age: 57
End: 2022-03-28
Payer: COMMERCIAL

## 2022-03-28 VITALS
HEART RATE: 64 BPM | BODY MASS INDEX: 20.72 KG/M2 | DIASTOLIC BLOOD PRESSURE: 83 MMHG | TEMPERATURE: 97.7 F | WEIGHT: 153 LBS | HEIGHT: 72 IN | SYSTOLIC BLOOD PRESSURE: 120 MMHG | RESPIRATION RATE: 18 BRPM

## 2022-03-28 DIAGNOSIS — Z86.010 PERSONAL HISTORY OF COLONIC POLYPS: ICD-10-CM

## 2022-03-28 DIAGNOSIS — K22.4 ESOPHAGEAL DYSMOTILITY: ICD-10-CM

## 2022-03-28 DIAGNOSIS — A04.8 HELICOBACTER PYLORI (H. PYLORI): ICD-10-CM

## 2022-03-28 DIAGNOSIS — R10.12 LEFT UPPER QUADRANT ABDOMINAL PAIN: ICD-10-CM

## 2022-03-28 DIAGNOSIS — Z80.0 FAMILY HISTORY OF COLON CANCER IN MOTHER: ICD-10-CM

## 2022-03-28 DIAGNOSIS — K30 FUNCTIONAL DYSPEPSIA: ICD-10-CM

## 2022-03-28 DIAGNOSIS — K59.01 SLOW TRANSIT CONSTIPATION: ICD-10-CM

## 2022-03-28 DIAGNOSIS — R10.84 GENERALIZED ABDOMINAL PAIN: ICD-10-CM

## 2022-03-28 DIAGNOSIS — K58.1 IRRITABLE BOWEL SYNDROME WITH CONSTIPATION: ICD-10-CM

## 2022-03-28 DIAGNOSIS — K21.9 GASTROESOPHAGEAL REFLUX DISEASE WITHOUT ESOPHAGITIS: ICD-10-CM

## 2022-03-28 DIAGNOSIS — E61.1 IRON DEFICIENCY: ICD-10-CM

## 2022-03-28 DIAGNOSIS — R10.32 LEFT LOWER QUADRANT ABDOMINAL PAIN: ICD-10-CM

## 2022-03-28 PROCEDURE — 99214 OFFICE O/P EST MOD 30 MIN: CPT | Performed by: INTERNAL MEDICINE

## 2022-03-28 RX ORDER — NALTREXONE HYDROCHLORIDE 50 MG/1
1 TABLET TABLET, FILM COATED ORAL ONCE A DAY
Status: ACTIVE | COMMUNITY

## 2022-03-28 RX ORDER — SUCRALFATE 1 G/10ML
10 ML - 20 ML SUSPENSION ORAL
Qty: 1000 ML | Refills: 11 | Status: DISCONTINUED | COMMUNITY
Start: 2021-06-03 | End: 2022-05-29

## 2022-03-28 RX ORDER — PANTOPRAZOLE SODIUM 40 MG/1
1 TABLET TABLET, DELAYED RELEASE ORAL ONCE A DAY
Qty: 90 | Refills: 3 | Status: ACTIVE | COMMUNITY
Start: 2021-12-27

## 2022-03-28 RX ORDER — VITAMIN A 10000 UNIT
1 TABLET TABLET ORAL ONCE A DAY
Status: ACTIVE | COMMUNITY

## 2022-03-28 RX ORDER — TRAZODONE HYDROCHLORIDE 100 MG/1
1 TABLET AT BEDTIME TABLET, FILM COATED ORAL ONCE A DAY
Status: ACTIVE | COMMUNITY

## 2022-03-28 RX ORDER — VIT A/VIT C/VIT E/ZINC/COPPER 4296-226
AS DIRECTED CAPSULE ORAL TWICE DAILY
Status: ON HOLD | COMMUNITY

## 2022-03-28 RX ORDER — LINACLOTIDE 145 UG/1
TAKE 1 CAPSULE DAILY AT LEAST 30 MINUTES BEFORE THE FIRST MEAL OF THE DAY ON AN EMPTY STOMACH CAPSULE, GELATIN COATED ORAL
Qty: 90 CAPSULE | Refills: 4 | Status: DISCONTINUED | COMMUNITY

## 2022-03-28 RX ORDER — GLUCOSAMINE/CHONDR SU A SOD 750-600 MG
1 CAPSULE TABLET ORAL ONCE A DAY
Status: ACTIVE | COMMUNITY

## 2022-03-28 RX ORDER — LINACLOTIDE 290 UG/1
1 CAPSULE AT LEAST 30 MINUTES BEFORE THE FIRST MEAL OF THE DAY ON AN EMPTY STOMACH CAPSULE, GELATIN COATED ORAL ONCE A DAY
Qty: 90 | Refills: 3 | Status: ON HOLD | COMMUNITY
Start: 2021-12-27 | End: 2022-12-22

## 2022-03-28 RX ORDER — THYROID 60 MG/1
1 TABLET ON AN EMPTY STOMACH TABLET ORAL ONCE A DAY
Status: ACTIVE | COMMUNITY

## 2022-03-28 RX ORDER — HYOSCYAMINE SULFATE 0.12 MG/1
1 TABLET UNDER THE TONGUE AND ALLOW TO DISSOLVE  AS NEEDED FOR ABDOMINAL PAIN TABLET, ORALLY DISINTEGRATING ORAL THREE TIMES A DAY
Qty: 40 | Refills: 4 | Status: ACTIVE | COMMUNITY
Start: 2021-12-27 | End: 2022-05-26

## 2022-03-28 RX ORDER — PROGESTERONE 200 MG/1
1 CAPSULE CAPSULE ORAL DAILY
Status: DISCONTINUED | COMMUNITY

## 2022-03-28 RX ORDER — TRAZODONE HYDROCHLORIDE 50 MG/1
1 TABLET AT BEDTIME TABLET, FILM COATED ORAL ONCE A DAY
Qty: 30 TABLET | Refills: 11 | Status: DISCONTINUED | COMMUNITY

## 2022-03-28 RX ORDER — ESCITALOPRAM 10 MG/1
1 TABLET TABLET, FILM COATED ORAL ONCE A DAY
Status: ACTIVE | COMMUNITY

## 2022-03-28 RX ORDER — LORAZEPAM 2 MG/1
1 TABLET AT BEDTIME AS NEEDED TABLET ORAL ONCE A DAY
Status: DISCONTINUED | COMMUNITY

## 2022-03-28 RX ORDER — CLONAZEPAM 0.5 MG/1
1 TABLET AT BEDTIME TABLET ORAL ONCE A DAY
Status: ON HOLD | COMMUNITY

## 2022-03-28 RX ORDER — PANTOPRAZOLE SODIUM 40 MG/1
1 TABLET TABLET, DELAYED RELEASE ORAL TWICE DAILY
Qty: 60 | Refills: 6 | Status: DISCONTINUED | COMMUNITY
Start: 2021-04-07

## 2022-03-28 RX ORDER — ASCORBIC ACID 125 MG
AS DIRECTED TABLET,CHEWABLE ORAL
Status: ACTIVE | COMMUNITY

## 2022-03-28 RX ORDER — FAMOTIDINE 40 MG/1
1 TABLET AT BEDTIME TABLET, FILM COATED ORAL ONCE A DAY
Qty: 90 | Refills: 3 | Status: ACTIVE | COMMUNITY
Start: 2021-03-31

## 2022-03-28 RX ORDER — MIRABEGRON 50 MG/1
1 TABLET TABLET, FILM COATED, EXTENDED RELEASE ORAL ONCE A DAY
Status: DISCONTINUED | COMMUNITY

## 2022-03-28 RX ORDER — PANTOPRAZOLE SODIUM 40 MG/1
1 TABLET TABLET, DELAYED RELEASE ORAL ONCE A DAY
Qty: 90 | Refills: 3 | Status: DISCONTINUED | COMMUNITY
Start: 2021-03-12
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
## 2022-03-30 ENCOUNTER — TELEPHONE ENCOUNTER (OUTPATIENT)
Dept: URBAN - METROPOLITAN AREA CLINIC 113 | Facility: CLINIC | Age: 57
End: 2022-03-30

## 2022-03-30 RX ORDER — TRAZODONE HYDROCHLORIDE 50 MG/1
1.5 TABLETS ONCE A DAY TABLET, FILM COATED ORAL ONCE A DAY
Qty: 135 TABLET | Refills: 3

## 2022-03-30 RX ORDER — FAMOTIDINE 40 MG/1
1 TABLET AT BEDTIME TABLET, FILM COATED ORAL ONCE A DAY
Qty: 90 | Refills: 3
Start: 2021-03-31

## 2022-03-30 RX ORDER — PANTOPRAZOLE SODIUM 40 MG/1
1 TABLET TABLET, DELAYED RELEASE ORAL ONCE A DAY
Qty: 90 | Refills: 3
Start: 2021-12-27

## 2022-04-23 ENCOUNTER — ERX REFILL RESPONSE (OUTPATIENT)
Dept: URBAN - METROPOLITAN AREA CLINIC 113 | Facility: CLINIC | Age: 57
End: 2022-04-23

## 2022-04-23 RX ORDER — MIRTAZAPINE 15 MG/1
TAKE ONE TABLET BY MOUTH AT BEDTIME TABLET ORAL
Qty: 90 TABLET | Refills: 1 | OUTPATIENT

## 2022-05-16 ENCOUNTER — OFFICE VISIT (OUTPATIENT)
Dept: URBAN - METROPOLITAN AREA SURGERY CENTER 25 | Facility: SURGERY CENTER | Age: 57
End: 2022-05-16

## 2022-07-18 ENCOUNTER — OFFICE VISIT (OUTPATIENT)
Dept: URBAN - METROPOLITAN AREA CLINIC 113 | Facility: CLINIC | Age: 57
End: 2022-07-18
Payer: COMMERCIAL

## 2022-07-18 ENCOUNTER — LAB OUTSIDE AN ENCOUNTER (OUTPATIENT)
Dept: URBAN - METROPOLITAN AREA CLINIC 113 | Facility: CLINIC | Age: 57
End: 2022-07-18

## 2022-07-18 VITALS
SYSTOLIC BLOOD PRESSURE: 117 MMHG | RESPIRATION RATE: 20 BRPM | HEART RATE: 91 BPM | HEIGHT: 72 IN | DIASTOLIC BLOOD PRESSURE: 88 MMHG | BODY MASS INDEX: 20.32 KG/M2 | WEIGHT: 150 LBS | TEMPERATURE: 97.3 F

## 2022-07-18 DIAGNOSIS — K59.01 SLOW TRANSIT CONSTIPATION: ICD-10-CM

## 2022-07-18 DIAGNOSIS — R10.12 LEFT UPPER QUADRANT ABDOMINAL PAIN: ICD-10-CM

## 2022-07-18 DIAGNOSIS — R10.32 LEFT LOWER QUADRANT ABDOMINAL PAIN: ICD-10-CM

## 2022-07-18 DIAGNOSIS — Z86.010 PERSONAL HISTORY OF COLONIC POLYPS: ICD-10-CM

## 2022-07-18 DIAGNOSIS — K30 FUNCTIONAL DYSPEPSIA: ICD-10-CM

## 2022-07-18 DIAGNOSIS — Z80.0 FAMILY HISTORY OF COLON CANCER IN MOTHER: ICD-10-CM

## 2022-07-18 DIAGNOSIS — E61.1 IRON DEFICIENCY: ICD-10-CM

## 2022-07-18 DIAGNOSIS — K22.4 ESOPHAGEAL DYSMOTILITY: ICD-10-CM

## 2022-07-18 DIAGNOSIS — K58.1 IRRITABLE BOWEL SYNDROME WITH CONSTIPATION: ICD-10-CM

## 2022-07-18 DIAGNOSIS — K21.9 GASTROESOPHAGEAL REFLUX DISEASE WITHOUT ESOPHAGITIS: ICD-10-CM

## 2022-07-18 PROCEDURE — 99214 OFFICE O/P EST MOD 30 MIN: CPT | Performed by: INTERNAL MEDICINE

## 2022-07-18 RX ORDER — ESCITALOPRAM 10 MG/1
1 TABLET TABLET, FILM COATED ORAL ONCE A DAY
Status: ON HOLD | COMMUNITY

## 2022-07-18 RX ORDER — VIT A/VIT C/VIT E/ZINC/COPPER 4296-226
AS DIRECTED CAPSULE ORAL TWICE DAILY
Status: ON HOLD | COMMUNITY

## 2022-07-18 RX ORDER — VENLAFAXINE HYDROCHLORIDE 37.5 MG/1
1 TABLET WITH FOOD TABLET ORAL ONCE A DAY
Status: ACTIVE | COMMUNITY

## 2022-07-18 RX ORDER — LACTULOSE 10 G/15ML
15 ML AS NEEDED SOLUTION ORAL
Status: ACTIVE | COMMUNITY

## 2022-07-18 RX ORDER — TRAZODONE HYDROCHLORIDE 50 MG/1
1.5 TABLETS ONCE A DAY TABLET, FILM COATED ORAL ONCE A DAY
Qty: 135 TABLET | Refills: 3 | Status: ON HOLD | COMMUNITY

## 2022-07-18 RX ORDER — ASCORBIC ACID 125 MG
AS DIRECTED TABLET,CHEWABLE ORAL
Status: ON HOLD | COMMUNITY

## 2022-07-18 RX ORDER — LORAZEPAM 1 MG/1
1 TABLET AT BEDTIME AS NEEDED TABLET ORAL TWICE A DAY
Status: ACTIVE | COMMUNITY

## 2022-07-18 RX ORDER — CLONAZEPAM 0.5 MG/1
1 TABLET AT BEDTIME TABLET ORAL ONCE A DAY
Status: ON HOLD | COMMUNITY

## 2022-07-18 RX ORDER — ENEMA 19; 7 G/133ML; G/133ML
AS DIRECTED ENEMA RECTAL
Status: ACTIVE | COMMUNITY

## 2022-07-18 RX ORDER — PANTOPRAZOLE SODIUM 40 MG/1
1 TABLET TABLET, DELAYED RELEASE ORAL ONCE A DAY
Qty: 90 | Refills: 3 | Status: ACTIVE | COMMUNITY
Start: 2021-12-27

## 2022-07-18 RX ORDER — POLYETHYLENE GLYCOL 3350 17 G/17G
AS DIRECTED POWDER, FOR SOLUTION ORAL ONCE A DAY
Status: ACTIVE | COMMUNITY

## 2022-07-18 RX ORDER — POLYETHYLENE GLYCOL 3350, SODIUM CHLORIDE, SODIUM BICARBONATE, POTASSIUM CHLORIDE 420; 11.2; 5.72; 1.48 G/4L; G/4L; G/4L; G/4L
AS DIRECTED POWDER, FOR SOLUTION ORAL ONCE
Qty: 420 GM | Refills: 4 | OUTPATIENT

## 2022-07-18 RX ORDER — THYROID 60 MG/1
1 TABLET ON AN EMPTY STOMACH TABLET ORAL ONCE A DAY
Status: ACTIVE | COMMUNITY

## 2022-07-18 RX ORDER — DOCUSATE SODIUM 100 MG/1
1 CAPSULE AS NEEDED CAPSULE, LIQUID FILLED ORAL ONCE A DAY
Status: ACTIVE | COMMUNITY

## 2022-07-18 RX ORDER — GLUCOSAMINE/CHONDR SU A SOD 750-600 MG
1 CAPSULE TABLET ORAL ONCE A DAY
Status: ON HOLD | COMMUNITY

## 2022-07-18 RX ORDER — FAMOTIDINE 40 MG/1
1 TABLET AT BEDTIME TABLET, FILM COATED ORAL ONCE A DAY
Qty: 90 | Refills: 3 | Status: ACTIVE | COMMUNITY
Start: 2021-03-31

## 2022-07-18 RX ORDER — MIRTAZAPINE 15 MG/1
TAKE ONE TABLET BY MOUTH AT BEDTIME TABLET ORAL
Qty: 90 TABLET | Refills: 1 | Status: ON HOLD | COMMUNITY

## 2022-07-18 RX ORDER — LINACLOTIDE 72 UG/1
1 CAPSULE AT LEAST 30 MINUTES BEFORE THE FIRST MEAL OF THE DAY ON AN EMPTY STOMACH CAPSULE, GELATIN COATED ORAL ONCE A DAY
Qty: 30 | Refills: 0 | OUTPATIENT

## 2022-07-18 RX ORDER — NALTREXONE HYDROCHLORIDE 50 MG/1
1 TABLET TABLET, FILM COATED ORAL ONCE A DAY
Status: ON HOLD | COMMUNITY

## 2022-07-18 RX ORDER — POLYETHYLENE GLYCOL 3350, SODIUM CHLORIDE, SODIUM BICARBONATE AND POTASSIUM CHLORIDE 420G
AS DIRECTED KIT ORAL ONCE
Qty: 420 GRAM | Refills: 0 | OUTPATIENT
Start: 2022-07-18 | End: 2022-07-19

## 2022-07-18 RX ORDER — LINACLOTIDE 290 UG/1
1 CAPSULE AT LEAST 30 MINUTES BEFORE THE FIRST MEAL OF THE DAY ON AN EMPTY STOMACH CAPSULE, GELATIN COATED ORAL ONCE A DAY
Qty: 90 | Refills: 3 | Status: ON HOLD | COMMUNITY
Start: 2021-12-27 | End: 2022-12-22

## 2022-07-18 RX ORDER — VITAMIN A 10000 UNIT
1 TABLET TABLET ORAL ONCE A DAY
Status: ON HOLD | COMMUNITY

## 2022-07-18 NOTE — EXAM-PHYSICAL EXAM
Rectal examination today was negative for fecal impaction.  No masses palpated.  There are no hemorrhoids.  Stool was tan and soft.  No evidence for any bleeding.

## 2022-07-18 NOTE — HPI-TODAY'S VISIT:
57-year-old female presents with a history of gastroesophageal reflux disease and significant constipation.   . Unfortunately, she is terribly upset today. Tearful. Very depressed. Anxious. She states she has severe constipation. Passing small balls. Migdalia development was six weeks ago. She took some Dulcolax and seem to help. Other likes just haven't helped. She states that Linzess cause cramping in the past. We can try this again though. No reports of like the bleeding. She never got her colonoscopy because she was admitted to a psychiatric facility. . She is now on Venlafaxine 37.5mg po daily. She did not tolerate olanzapine. She is off of trazadone and lexapro. She has seen a therapist. She was admitted to [facility] by the [facility] in early June for 4 days. Prior to this, she was seen in the [facility] ER.  Having tremendous difficulty with pelvic floor dysfunction and pain.  Also seeing a therapist for this.  She has a long hx of severe constipation.  Also getting therapy for pelvic floor dysfunction.  She has burning in the pelvis.  She has used ice packs from time to time. . She has been seen by multiple gastroenterologists in [place] and in our area.  Previously she has seen Dr. James.  She also saw Dr. Mcmahan and also saw Dr. Phillips.  She has a known history of anxiety.  She also has a history of esophageal spasm.  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  She has interstitial cystitis.  She had a CT scan at the [facility] and was told that she had diverticulitis.  Last colonoscopy was 3 yrs ago.  Prior colonoscopies were notable for adenomatous polyps.   . CT Abd and Pelvis with IV contrast July 12, 2022.  Constipation otherwise normal.  . Repeat HP breath test negative 12/2021. . She was seen in the ER in Nov 2021. She was prescribed Prevpak. She took this in November. (2nd course) . CT Abd and Pelvis with contrast June 2021.  . HP breath Test May 2021 negative . CT Abd and Pelvis May 2021 Normal.  . EGD March 31st 2021.  Small hiatal hernia and patulous lower esophageal sphincter.  Mild nonerosive gastritis.  Biopsies were positive for H. pylori. . Barium swallow March 2021.  Consistent with esophageal dysmotility. . EGD 20 years ago.  Reportedly notable for H. pylori on biopsies.  She was treated with quadruple therapy at that time  . On 2 different occasions in the past celiac testing was negative serologically.   . Esophageal manometry 2017: Reportedly Negative for spasm. But then she states she had Nutcracker esophagus.

## 2022-08-10 ENCOUNTER — TELEPHONE ENCOUNTER (OUTPATIENT)
Dept: URBAN - METROPOLITAN AREA CLINIC 113 | Facility: CLINIC | Age: 57
End: 2022-08-10

## 2022-08-10 RX ORDER — LINACLOTIDE 290 UG/1
1 CAPSULE AT LEAST 30 MINUTES BEFORE THE FIRST MEAL OF THE DAY ON AN EMPTY STOMACH CAPSULE, GELATIN COATED ORAL ONCE A DAY
Qty: 90 | Refills: 3 | OUTPATIENT

## 2022-08-26 ENCOUNTER — TELEPHONE ENCOUNTER (OUTPATIENT)
Dept: URBAN - METROPOLITAN AREA CLINIC 113 | Facility: CLINIC | Age: 57
End: 2022-08-26

## 2022-09-02 ENCOUNTER — WEB ENCOUNTER (OUTPATIENT)
Dept: URBAN - METROPOLITAN AREA SURGERY CENTER 25 | Facility: SURGERY CENTER | Age: 57
End: 2022-09-02

## 2022-09-02 ENCOUNTER — OFFICE VISIT (OUTPATIENT)
Dept: URBAN - METROPOLITAN AREA SURGERY CENTER 25 | Facility: SURGERY CENTER | Age: 57
End: 2022-09-02
Payer: COMMERCIAL

## 2022-09-02 ENCOUNTER — CLAIMS CREATED FROM THE CLAIM WINDOW (OUTPATIENT)
Dept: URBAN - METROPOLITAN AREA CLINIC 4 | Facility: CLINIC | Age: 57
End: 2022-09-02
Payer: COMMERCIAL

## 2022-09-02 DIAGNOSIS — K63.89 OTHER SPECIFIED DISEASES OF INTESTINE: ICD-10-CM

## 2022-09-02 DIAGNOSIS — D12.2 BENIGN NEOPLASM OF ASCENDING COLON: ICD-10-CM

## 2022-09-02 DIAGNOSIS — Z86.010 PERSONAL HISTORY OF COLONIC POLYPS: ICD-10-CM

## 2022-09-02 DIAGNOSIS — D12.2 ADENOMA OF ASCENDING COLON: ICD-10-CM

## 2022-09-02 DIAGNOSIS — Z80.0 FAMILY HISTORY OF COLON CANCER IN MOTHER: ICD-10-CM

## 2022-09-02 DIAGNOSIS — K58.9 IBS (IRRITABLE BOWEL SYNDROME): ICD-10-CM

## 2022-09-02 PROCEDURE — 45385 COLONOSCOPY W/LESION REMOVAL: CPT | Performed by: INTERNAL MEDICINE

## 2022-09-02 PROCEDURE — G8907 PT DOC NO EVENTS ON DISCHARG: HCPCS | Performed by: INTERNAL MEDICINE

## 2022-09-02 PROCEDURE — 88305 TISSUE EXAM BY PATHOLOGIST: CPT | Performed by: PATHOLOGY

## 2022-09-02 PROCEDURE — 45380 COLONOSCOPY AND BIOPSY: CPT | Performed by: INTERNAL MEDICINE

## 2022-09-02 RX ORDER — CLONAZEPAM 0.5 MG/1
1 TABLET AT BEDTIME TABLET ORAL ONCE A DAY
Status: ON HOLD | COMMUNITY

## 2022-09-02 RX ORDER — POLYETHYLENE GLYCOL 3350 17 G/17G
AS DIRECTED POWDER, FOR SOLUTION ORAL ONCE A DAY
Status: ACTIVE | COMMUNITY

## 2022-09-02 RX ORDER — ESCITALOPRAM 10 MG/1
1 TABLET TABLET, FILM COATED ORAL ONCE A DAY
Status: ON HOLD | COMMUNITY

## 2022-09-02 RX ORDER — THYROID 60 MG/1
1 TABLET ON AN EMPTY STOMACH TABLET ORAL ONCE A DAY
Status: ACTIVE | COMMUNITY

## 2022-09-02 RX ORDER — POLYETHYLENE GLYCOL 3350, SODIUM CHLORIDE, SODIUM BICARBONATE, POTASSIUM CHLORIDE 420; 11.2; 5.72; 1.48 G/4L; G/4L; G/4L; G/4L
AS DIRECTED POWDER, FOR SOLUTION ORAL ONCE
Qty: 420 GM | Refills: 4 | Status: ACTIVE | COMMUNITY

## 2022-09-02 RX ORDER — ASCORBIC ACID 125 MG
AS DIRECTED TABLET,CHEWABLE ORAL
Status: ON HOLD | COMMUNITY

## 2022-09-02 RX ORDER — VITAMIN A 10000 UNIT
1 TABLET TABLET ORAL ONCE A DAY
Status: ON HOLD | COMMUNITY

## 2022-09-02 RX ORDER — GLUCOSAMINE/CHONDR SU A SOD 750-600 MG
1 CAPSULE TABLET ORAL ONCE A DAY
Status: ON HOLD | COMMUNITY

## 2022-09-02 RX ORDER — ENEMA 19; 7 G/133ML; G/133ML
AS DIRECTED ENEMA RECTAL
Status: ACTIVE | COMMUNITY

## 2022-09-02 RX ORDER — FAMOTIDINE 40 MG/1
1 TABLET AT BEDTIME TABLET, FILM COATED ORAL ONCE A DAY
Qty: 90 | Refills: 3 | Status: ACTIVE | COMMUNITY
Start: 2021-03-31

## 2022-09-02 RX ORDER — LINACLOTIDE 72 UG/1
1 CAPSULE AT LEAST 30 MINUTES BEFORE THE FIRST MEAL OF THE DAY ON AN EMPTY STOMACH CAPSULE, GELATIN COATED ORAL ONCE A DAY
Qty: 30 | Refills: 0 | Status: ACTIVE | COMMUNITY

## 2022-09-02 RX ORDER — VIT A/VIT C/VIT E/ZINC/COPPER 4296-226
AS DIRECTED CAPSULE ORAL TWICE DAILY
Status: ON HOLD | COMMUNITY

## 2022-09-02 RX ORDER — NALTREXONE HYDROCHLORIDE 50 MG/1
1 TABLET TABLET, FILM COATED ORAL ONCE A DAY
Status: ON HOLD | COMMUNITY

## 2022-09-02 RX ORDER — LINACLOTIDE 290 UG/1
1 CAPSULE AT LEAST 30 MINUTES BEFORE THE FIRST MEAL OF THE DAY ON AN EMPTY STOMACH CAPSULE, GELATIN COATED ORAL ONCE A DAY
Qty: 90 | Refills: 3 | Status: ON HOLD | COMMUNITY
Start: 2021-12-27 | End: 2022-12-22

## 2022-09-02 RX ORDER — TRAZODONE HYDROCHLORIDE 50 MG/1
1.5 TABLETS ONCE A DAY TABLET, FILM COATED ORAL ONCE A DAY
Qty: 135 TABLET | Refills: 3 | Status: ON HOLD | COMMUNITY

## 2022-09-02 RX ORDER — LINACLOTIDE 290 UG/1
1 CAPSULE AT LEAST 30 MINUTES BEFORE THE FIRST MEAL OF THE DAY ON AN EMPTY STOMACH CAPSULE, GELATIN COATED ORAL ONCE A DAY
Qty: 90 | Refills: 3 | Status: ACTIVE | COMMUNITY

## 2022-09-02 RX ORDER — PANTOPRAZOLE SODIUM 40 MG/1
1 TABLET TABLET, DELAYED RELEASE ORAL ONCE A DAY
Qty: 90 | Refills: 3 | Status: ACTIVE | COMMUNITY
Start: 2021-12-27

## 2022-09-02 RX ORDER — LORAZEPAM 1 MG/1
1 TABLET AT BEDTIME AS NEEDED TABLET ORAL TWICE A DAY
Status: ACTIVE | COMMUNITY

## 2022-09-02 RX ORDER — MIRTAZAPINE 15 MG/1
TAKE ONE TABLET BY MOUTH AT BEDTIME TABLET ORAL
Qty: 90 TABLET | Refills: 1 | Status: ON HOLD | COMMUNITY

## 2022-09-02 RX ORDER — LACTULOSE 10 G/15ML
15 ML AS NEEDED SOLUTION ORAL
Status: ACTIVE | COMMUNITY

## 2022-09-02 RX ORDER — DOCUSATE SODIUM 100 MG/1
1 CAPSULE AS NEEDED CAPSULE, LIQUID FILLED ORAL ONCE A DAY
Status: ACTIVE | COMMUNITY

## 2022-09-02 RX ORDER — VENLAFAXINE HYDROCHLORIDE 37.5 MG/1
1 TABLET WITH FOOD TABLET ORAL ONCE A DAY
Status: ACTIVE | COMMUNITY

## 2022-12-05 ENCOUNTER — WEB ENCOUNTER (OUTPATIENT)
Dept: URBAN - METROPOLITAN AREA CLINIC 113 | Facility: CLINIC | Age: 57
End: 2022-12-05

## 2022-12-05 ENCOUNTER — OFFICE VISIT (OUTPATIENT)
Dept: URBAN - METROPOLITAN AREA CLINIC 113 | Facility: CLINIC | Age: 57
End: 2022-12-05
Payer: COMMERCIAL

## 2022-12-05 VITALS
WEIGHT: 162 LBS | BODY MASS INDEX: 21.94 KG/M2 | SYSTOLIC BLOOD PRESSURE: 121 MMHG | TEMPERATURE: 97.1 F | DIASTOLIC BLOOD PRESSURE: 79 MMHG | HEART RATE: 67 BPM | HEIGHT: 72 IN | RESPIRATION RATE: 16 BRPM

## 2022-12-05 DIAGNOSIS — K59.01 SLOW TRANSIT CONSTIPATION: ICD-10-CM

## 2022-12-05 DIAGNOSIS — Z80.0 FAMILY HISTORY OF COLON CANCER IN MOTHER: ICD-10-CM

## 2022-12-05 DIAGNOSIS — K58.1 IRRITABLE BOWEL SYNDROME WITH CONSTIPATION: ICD-10-CM

## 2022-12-05 DIAGNOSIS — K30 FUNCTIONAL DYSPEPSIA: ICD-10-CM

## 2022-12-05 DIAGNOSIS — Z86.010 PERSONAL HISTORY OF COLONIC POLYPS: ICD-10-CM

## 2022-12-05 DIAGNOSIS — K58.9 IBS (IRRITABLE BOWEL SYNDROME): ICD-10-CM

## 2022-12-05 DIAGNOSIS — R10.32 LEFT LOWER QUADRANT ABDOMINAL PAIN: ICD-10-CM

## 2022-12-05 DIAGNOSIS — E61.1 IRON DEFICIENCY: ICD-10-CM

## 2022-12-05 DIAGNOSIS — K21.9 GASTROESOPHAGEAL REFLUX DISEASE WITHOUT ESOPHAGITIS: ICD-10-CM

## 2022-12-05 DIAGNOSIS — K22.4 ESOPHAGEAL DYSMOTILITY: ICD-10-CM

## 2022-12-05 DIAGNOSIS — R10.12 LEFT UPPER QUADRANT ABDOMINAL PAIN: ICD-10-CM

## 2022-12-05 PROCEDURE — 99214 OFFICE O/P EST MOD 30 MIN: CPT | Performed by: INTERNAL MEDICINE

## 2022-12-05 RX ORDER — ALPRAZOLAM 2 MG/1
1 TABLET TABLET ORAL TWICE A DAY
Status: ACTIVE | COMMUNITY

## 2022-12-05 RX ORDER — ASCORBIC ACID 125 MG
AS DIRECTED TABLET,CHEWABLE ORAL
Status: ACTIVE | COMMUNITY

## 2022-12-05 RX ORDER — CALCIUM CARBONATE 260MG(650)
1 LOZENGE AS NEEDED TABLET,CHEWABLE ORAL
Status: ACTIVE | COMMUNITY

## 2022-12-05 RX ORDER — LINACLOTIDE 72 UG/1
1 CAPSULE AT LEAST 30 MINUTES BEFORE THE FIRST MEAL OF THE DAY ON AN EMPTY STOMACH CAPSULE, GELATIN COATED ORAL ONCE A DAY
Qty: 30 | Refills: 0 | Status: ON HOLD | COMMUNITY

## 2022-12-05 RX ORDER — POLYETHYLENE GLYCOL 3350, SODIUM CHLORIDE, SODIUM BICARBONATE, POTASSIUM CHLORIDE 420; 11.2; 5.72; 1.48 G/4L; G/4L; G/4L; G/4L
AS DIRECTED POWDER, FOR SOLUTION ORAL ONCE
Qty: 420 GM | Refills: 4 | Status: ON HOLD | COMMUNITY

## 2022-12-05 RX ORDER — FERROUS SULFATE 325(65) MG
1 TABLET TABLET ORAL ONCE A DAY
Status: ACTIVE | COMMUNITY

## 2022-12-05 RX ORDER — TRAZODONE HYDROCHLORIDE 50 MG/1
1.5 TABLETS ONCE A DAY TABLET, FILM COATED ORAL ONCE A DAY
Qty: 135 TABLET | Refills: 3 | Status: ON HOLD | COMMUNITY

## 2022-12-05 RX ORDER — CHOLECALCIFEROL (VITAMIN D3) 1MM UNIT/G
AS DIRECTED LIQUID (ML) MISCELLANEOUS
Status: ACTIVE | COMMUNITY

## 2022-12-05 RX ORDER — VITAMIN A 10000 UNIT
1 TABLET TABLET ORAL ONCE A DAY
Status: ON HOLD | COMMUNITY

## 2022-12-05 RX ORDER — VENLAFAXINE HYDROCHLORIDE 37.5 MG/1
1 TABLET WITH FOOD TABLET ORAL ONCE A DAY
Status: ON HOLD | COMMUNITY

## 2022-12-05 RX ORDER — MIRTAZAPINE 15 MG/1
TAKE ONE TABLET BY MOUTH AT BEDTIME TABLET ORAL
Qty: 90 TABLET | Refills: 1 | Status: ON HOLD | COMMUNITY

## 2022-12-05 RX ORDER — FAMOTIDINE 40 MG/1
1 TABLET AT BEDTIME TABLET, FILM COATED ORAL ONCE A DAY
Qty: 90 | Refills: 3 | Status: ACTIVE | COMMUNITY
Start: 2021-03-31

## 2022-12-05 RX ORDER — NALTREXONE HYDROCHLORIDE 50 MG/1
1 TABLET TABLET, FILM COATED ORAL ONCE A DAY
Status: ON HOLD | COMMUNITY

## 2022-12-05 RX ORDER — LANOLIN ALCOHOL/MO/W.PET/CERES
1 TABLET CREAM (GRAM) TOPICAL ONCE A DAY
Status: ACTIVE | COMMUNITY

## 2022-12-05 RX ORDER — PANTOPRAZOLE SODIUM 40 MG/1
1 TABLET TABLET, DELAYED RELEASE ORAL ONCE A DAY
Qty: 90 | Refills: 3 | Status: ACTIVE | COMMUNITY
Start: 2021-12-27

## 2022-12-05 RX ORDER — THYROID 60 MG/1
1 TABLET ON AN EMPTY STOMACH TABLET ORAL ONCE A DAY
Status: ACTIVE | COMMUNITY

## 2022-12-05 RX ORDER — PAROXETINE HYDROCHLORIDE 30 MG/1
1 TABLET IN THE MORNING TABLET, FILM COATED ORAL ONCE A DAY
Status: ACTIVE | COMMUNITY

## 2022-12-05 RX ORDER — FLAXSEED OIL 1000 MG
1 TABLET WITH A MEAL CAPSULE ORAL ONCE A DAY
Status: ACTIVE | COMMUNITY

## 2022-12-05 RX ORDER — LORATADINE 10 MG/1
1 TABLET TABLET ORAL ONCE A DAY
Status: ACTIVE | COMMUNITY

## 2022-12-05 RX ORDER — CLONAZEPAM 0.5 MG/1
1 TABLET AT BEDTIME TABLET ORAL ONCE A DAY
Status: ON HOLD | COMMUNITY

## 2022-12-05 RX ORDER — LINACLOTIDE 290 UG/1
1 CAPSULE AT LEAST 30 MINUTES BEFORE THE FIRST MEAL OF THE DAY ON AN EMPTY STOMACH CAPSULE, GELATIN COATED ORAL ONCE A DAY
Qty: 90 | Refills: 3 | Status: ACTIVE | COMMUNITY

## 2022-12-05 RX ORDER — POLYETHYLENE GLYCOL 3350 17 G/17G
AS DIRECTED POWDER, FOR SOLUTION ORAL ONCE A DAY
Status: ACTIVE | COMMUNITY

## 2022-12-05 RX ORDER — ENEMA 19; 7 G/133ML; G/133ML
AS DIRECTED ENEMA RECTAL
Status: ON HOLD | COMMUNITY

## 2022-12-05 RX ORDER — LACTULOSE 10 G/15ML
15 ML AS NEEDED SOLUTION ORAL
Status: ON HOLD | COMMUNITY

## 2022-12-05 RX ORDER — LINACLOTIDE 290 UG/1
1 CAPSULE AT LEAST 30 MINUTES BEFORE THE FIRST MEAL OF THE DAY ON AN EMPTY STOMACH CAPSULE, GELATIN COATED ORAL ONCE A DAY
Qty: 90 | Refills: 3
End: 2023-11-30

## 2022-12-05 RX ORDER — LORAZEPAM 1 MG/1
1 TABLET AT BEDTIME AS NEEDED TABLET ORAL TWICE A DAY
Status: ON HOLD | COMMUNITY

## 2022-12-05 RX ORDER — LINACLOTIDE 290 UG/1
1 CAPSULE AT LEAST 30 MINUTES BEFORE THE FIRST MEAL OF THE DAY ON AN EMPTY STOMACH CAPSULE, GELATIN COATED ORAL ONCE A DAY
Qty: 90 | Refills: 3 | Status: ON HOLD | COMMUNITY
Start: 2021-12-27 | End: 2022-12-22

## 2022-12-05 RX ORDER — VIT A/VIT C/VIT E/ZINC/COPPER 4296-226
AS DIRECTED CAPSULE ORAL TWICE DAILY
Status: ON HOLD | COMMUNITY

## 2022-12-05 RX ORDER — ESCITALOPRAM 10 MG/1
1 TABLET TABLET, FILM COATED ORAL ONCE A DAY
Status: ON HOLD | COMMUNITY

## 2022-12-05 RX ORDER — PANTOPRAZOLE SODIUM 40 MG/1
1 TABLET TABLET, DELAYED RELEASE ORAL ONCE A DAY
Qty: 90 | Refills: 3
Start: 2021-12-27

## 2022-12-05 RX ORDER — FAMOTIDINE 40 MG/1
1 TABLET AT BEDTIME TABLET, FILM COATED ORAL ONCE A DAY
Qty: 90 | Refills: 3
Start: 2021-03-31

## 2022-12-05 RX ORDER — ASCORBIC ACID 125 MG
AS DIRECTED TABLET,CHEWABLE ORAL
Status: ON HOLD | COMMUNITY

## 2022-12-05 RX ORDER — DOCUSATE SODIUM 100 MG/1
1 CAPSULE AS NEEDED CAPSULE, LIQUID FILLED ORAL ONCE A DAY
Status: ON HOLD | COMMUNITY

## 2022-12-05 RX ORDER — GABAPENTIN 800 MG/1
1 TABLET TABLET, FILM COATED ORAL ONCE A DAY
Status: ACTIVE | COMMUNITY

## 2022-12-05 RX ORDER — GLUCOSAMINE/CHONDR SU A SOD 750-600 MG
1 CAPSULE TABLET ORAL ONCE A DAY
Status: ON HOLD | COMMUNITY

## 2022-12-05 NOTE — HPI-TODAY'S VISIT:
57-year-old female presents with a history of gastroesophageal reflux disease, functional dyspepsia, constipation predominant IBS, severe anxiety, and depression.   . Now., she is on paxil 40mg po daily. She is also on Xanax ER. She was on Venlafaxine 37.5mg po daily. She did not tolerate olanzapine. She is off of trazadone and lexapro. She has seen a therapist. She was admitted to [facility] by the [facility] in early June for 4 days. She has a known history of anxiety.  Prior to this, she was seen in the [facility] ER.  Having tremendous difficulty with pelvic floor dysfunction and pain.  Also seeing a therapist for this.  She is doing much better on Linzess. She is having more frequent stools at this time. She has burning in the pelvis.  She has a long hx of severe constipation.  Also getting therapy for pelvic floor dysfunction.   . Her mother passed away Sept 2022 due to metastatic colon cancer. She was in hospitce prior to passing. Apparently, she had a whipple procedure and colostomy as well.  . She has seen by multiple gastroenterologists in [place] and in our area.  Previously she has seen Dr. James.  She also saw Dr. Mcmahan and also saw Dr. Phillips.  She also has a history of esophageal spasm.  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  She has interstitial cystitis.   . Colonoscopy September 2022. 5 mm ascending colon polyp resected. Markedly redundant colon with excessive looping. No diverticulosis noted. Random biopsies were obtained. The colon polyp was a benign adenoma. Random biopsies were negative for significant colitis. Prior colonoscopies were notable for adenomatous polyps.   . CT Abd and Pelvis with IV contrast July 12, 2022.  Constipation otherwise normal.  . Repeat HP breath test negative 12/2021. . She was seen in the ER in Nov 2021. She was prescribed Prevpak. She took this in November. (2nd course) . CT Abd and Pelvis with contrast June 2021.  . HP breath Test May 2021 negative . CT Abd and Pelvis May 2021 Normal.  . EGD March 31st 2021.  Small hiatal hernia and patulous lower esophageal sphincter.  Mild nonerosive gastritis.  Biopsies were positive for H. pylori. . Barium swallow March 2021.  Consistent with esophageal dysmotility. . EGD 20 years ago.  Reportedly notable for H. pylori on biopsies.  She was treated with quadruple therapy at that time  . On 2 different occasions in the past celiac testing was negative serologically.   . Esophageal manometry 2017: Reportedly Negative for spasm. But then she states she had Nutcracker esophagus.

## 2022-12-06 PROBLEM — 301716002 LEFT LOWER QUADRANT PAIN: Status: ACTIVE | Noted: 2021-05-14

## 2022-12-06 PROBLEM — 35298007: Status: ACTIVE | Noted: 2021-03-12

## 2022-12-06 PROBLEM — 10743008 IRRITABLE BOWEL SYNDROME: Status: ACTIVE | Noted: 2022-09-20

## 2022-12-06 PROBLEM — 35240004 IRON DEFICIENCY: Status: ACTIVE | Noted: 2021-05-14

## 2022-12-06 PROBLEM — 266434009 ESOPHAGEAL DYSMOTILITY: Status: ACTIVE | Noted: 2021-05-14

## 2022-12-06 PROBLEM — 440630006: Status: ACTIVE | Noted: 2021-03-12

## 2022-12-06 PROBLEM — 301715003 LEFT UPPER QUADRANT PAIN: Status: ACTIVE | Noted: 2021-05-14

## 2022-12-06 PROBLEM — 428283002: Status: ACTIVE | Noted: 2021-03-12

## 2022-12-06 PROBLEM — 312824007: Status: ACTIVE | Noted: 2021-03-12

## 2022-12-08 ENCOUNTER — OFFICE VISIT (OUTPATIENT)
Dept: URBAN - METROPOLITAN AREA CLINIC 113 | Facility: CLINIC | Age: 57
End: 2022-12-08

## 2023-02-06 ENCOUNTER — OFFICE VISIT (OUTPATIENT)
Dept: URBAN - METROPOLITAN AREA CLINIC 113 | Facility: CLINIC | Age: 58
End: 2023-02-06

## 2023-02-09 ENCOUNTER — OFFICE VISIT (OUTPATIENT)
Dept: URBAN - METROPOLITAN AREA CLINIC 113 | Facility: CLINIC | Age: 58
End: 2023-02-09

## 2023-02-16 ENCOUNTER — TELEPHONE ENCOUNTER (OUTPATIENT)
Dept: URBAN - METROPOLITAN AREA CLINIC 113 | Facility: CLINIC | Age: 58
End: 2023-02-16

## 2023-02-16 RX ORDER — SUCRALFATE 1 G/1
1 TABLET ON AN EMPTY STOMACH TABLET ORAL
Qty: 60 | Refills: 6 | OUTPATIENT
Start: 2023-02-17 | End: 2023-09-15

## 2023-02-17 ENCOUNTER — LAB OUTSIDE AN ENCOUNTER (OUTPATIENT)
Dept: URBAN - METROPOLITAN AREA CLINIC 113 | Facility: CLINIC | Age: 58
End: 2023-02-17

## 2023-02-17 PROBLEM — 266435005: Status: ACTIVE | Noted: 2021-03-12

## 2023-02-22 ENCOUNTER — OFFICE VISIT (OUTPATIENT)
Dept: URBAN - METROPOLITAN AREA CLINIC 107 | Facility: CLINIC | Age: 58
End: 2023-02-22
Payer: COMMERCIAL

## 2023-02-22 VITALS
WEIGHT: 165 LBS | SYSTOLIC BLOOD PRESSURE: 108 MMHG | BODY MASS INDEX: 22.35 KG/M2 | HEART RATE: 67 BPM | HEIGHT: 72 IN | TEMPERATURE: 98.1 F | DIASTOLIC BLOOD PRESSURE: 69 MMHG

## 2023-02-22 DIAGNOSIS — K30 FUNCTIONAL DYSPEPSIA: ICD-10-CM

## 2023-02-22 DIAGNOSIS — R13.10 DYSPHAGIA: ICD-10-CM

## 2023-02-22 DIAGNOSIS — K21.9 GERD: ICD-10-CM

## 2023-02-22 DIAGNOSIS — R10.13 EPIGASTRIC ABDOMINAL PAIN: ICD-10-CM

## 2023-02-22 PROBLEM — 3696007 FUNCTIONAL DYSPEPSIA: Status: ACTIVE | Noted: 2021-12-27

## 2023-02-22 PROBLEM — 40739000 DYSPHAGIA: Status: ACTIVE | Noted: 2023-02-22

## 2023-02-22 PROCEDURE — 99214 OFFICE O/P EST MOD 30 MIN: CPT | Performed by: NURSE PRACTITIONER

## 2023-02-22 RX ORDER — THYROID 60 MG/1
1 TABLET ON AN EMPTY STOMACH TABLET ORAL ONCE A DAY
Status: ACTIVE | COMMUNITY

## 2023-02-22 RX ORDER — GABAPENTIN 100 MG/1
1 CAPSULE CAPSULE ORAL ONCE A DAY
Status: ACTIVE | COMMUNITY

## 2023-02-22 RX ORDER — POLYETHYLENE GLYCOL 3350 17 G/17G
AS DIRECTED POWDER, FOR SOLUTION ORAL ONCE A DAY
Status: ACTIVE | COMMUNITY

## 2023-02-22 RX ORDER — FAMOTIDINE 40 MG/1
1 TABLET AT BEDTIME TABLET, FILM COATED ORAL ONCE A DAY
Qty: 90 | Refills: 3 | Status: ACTIVE | COMMUNITY
Start: 2021-03-31

## 2023-02-22 RX ORDER — LINACLOTIDE 290 UG/1
1 CAPSULE AT LEAST 30 MINUTES BEFORE THE FIRST MEAL OF THE DAY ON AN EMPTY STOMACH CAPSULE, GELATIN COATED ORAL ONCE A DAY
Qty: 90 | Refills: 3 | Status: ACTIVE | COMMUNITY
End: 2023-11-30

## 2023-02-22 RX ORDER — PAROXETINE HYDROCHLORIDE 40 MG/1
1 TABLET IN THE MORNING TABLET, FILM COATED ORAL ONCE A DAY
Status: ACTIVE | COMMUNITY

## 2023-02-22 RX ORDER — MIRABEGRON 50 MG/1
1 TABLET TABLET, FILM COATED, EXTENDED RELEASE ORAL ONCE A DAY
Status: ACTIVE | COMMUNITY

## 2023-02-22 RX ORDER — GLUCOSAMINE/CHONDR SU A SOD 750-600 MG
1 CAPSULE TABLET ORAL ONCE A DAY
Status: ACTIVE | COMMUNITY

## 2023-02-22 RX ORDER — FERROUS SULFATE 325(65) MG
1 TABLET TABLET ORAL ONCE A DAY
Status: ACTIVE | COMMUNITY

## 2023-02-22 RX ORDER — CALCIUM CARBONATE 260MG(650)
1 LOZENGE AS NEEDED TABLET,CHEWABLE ORAL
Status: ACTIVE | COMMUNITY

## 2023-02-22 RX ORDER — ALPRAZOLAM 2 MG/1
1 TABLET TABLET ORAL TWICE A DAY
Status: ACTIVE | COMMUNITY

## 2023-02-22 RX ORDER — LANOLIN ALCOHOL/MO/W.PET/CERES
1 TABLET CREAM (GRAM) TOPICAL ONCE A DAY
Status: ACTIVE | COMMUNITY

## 2023-02-22 RX ORDER — LORATADINE 10 MG/1
1 TABLET TABLET ORAL ONCE A DAY
Status: ACTIVE | COMMUNITY

## 2023-02-22 RX ORDER — ENEMA 19; 7 G/133ML; G/133ML
AS DIRECTED ENEMA RECTAL
Status: ACTIVE | COMMUNITY

## 2023-02-22 RX ORDER — SUCRALFATE 1 G/1
1 TABLET ON AN EMPTY STOMACH TABLET ORAL
Qty: 60 | Refills: 6 | Status: ACTIVE | COMMUNITY
Start: 2023-02-17 | End: 2023-09-15

## 2023-02-22 RX ORDER — PANTOPRAZOLE SODIUM 40 MG/1
1 TABLET TABLET, DELAYED RELEASE ORAL TWICE DAILY
Qty: 180 TABLET | Refills: 3 | OUTPATIENT
Start: 2023-02-22

## 2023-02-22 RX ORDER — FLAXSEED OIL 1000 MG
1 TABLET WITH A MEAL CAPSULE ORAL ONCE A DAY
Status: ACTIVE | COMMUNITY

## 2023-02-22 RX ORDER — ASCORBIC ACID 125 MG
AS DIRECTED TABLET,CHEWABLE ORAL
Status: ACTIVE | COMMUNITY

## 2023-02-22 RX ORDER — GABAPENTIN 400 MG/1
1 TABLET CAPSULE ORAL ONCE A DAY
Status: ACTIVE | COMMUNITY

## 2023-02-22 RX ORDER — VITAMIN A 10000 UNIT
1 TABLET TABLET ORAL ONCE A DAY
Status: ACTIVE | COMMUNITY

## 2023-02-22 RX ORDER — PANTOPRAZOLE SODIUM 40 MG/1
1 TABLET TABLET, DELAYED RELEASE ORAL ONCE A DAY
Qty: 90 | Refills: 3 | Status: ACTIVE | COMMUNITY
Start: 2021-12-27

## 2023-02-22 NOTE — HPI-OTHER HISTORIES
She has seen by multiple gastroenterologists in [place] and in our area.  Previously she has seen Dr. James.  She also saw Dr. Mcmahan and also saw Dr. Phillips.  She also has a history of esophageal spasm.  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  She has interstitial cystitis.   Colonoscopy September 2022. 5 mm ascending colon polyp resected. Markedly redundant colon with excessive looping. No diverticulosis noted. Random biopsies were obtained. The colon polyp was a benign adenoma. Random biopsies were negative for significant colitis. Prior colonoscopies were notable for adenomatous polyps.   CT Abd and Pelvis with IV contrast July 12, 2022.  Constipation otherwise normal.  Repeat HP breath test negative 12/2021. She was seen in the ER in Nov 2021. She was prescribed Prevpak. She took this in November. (2nd course) CT Abd and Pelvis with contrast June 2021.  HP breath Test May 2021 negative CT Abd and Pelvis May 2021 Normal.  EGD March 31st 2021.  Small hiatal hernia and patulous lower esophageal sphincter.  Mild nonerosive gastritis.  Biopsies were positive for H. pylori. Barium swallow March 2021.  Consistent with esophageal dysmotility. EGD 20 years ago.  Reportedly notable for H. pylori on biopsies.  She was treated with quadruple therapy at that time  On 2 different occasions in the past celiac testing was negative serologically.   Esophageal manometry 2017: Reportedly Negative for spasm. But then she states she had Nutcracker esophagus.

## 2023-02-22 NOTE — HPI-TODAY'S VISIT:
57-year-old female presents with a history of gastroesophageal reflux disease, functional dyspepsia, constipation predominant IBS, severe anxiety, and depression, presenting for evaluation of heartburn. She was seen in the office in December for routine follow-up of GERD, functional dyspepsia, esophageal spasm, and irritable bowel syndrome. Her symptoms were overall stable on her regimen with Linzess, pantoprazole, famotidine, and nitroglycerin when needed, following improved management of her anxiety and depression. She notified our office on 2/16/23 of burning upper abdominal and chest pain. She was recommended a trial of Carafate and an upper GI series was planned. She was hopeful more could be done for her than was offered over the phone, so she made this appoinment. The Carafate has been minimally helpful. She complains of constant burning around the level of the sternal notch. This is worsened after eating, resulting in burning throughout the epigastrium and esophagus, and production of foam in her mouth. She states she does not want to eat because of the discomfort. She has occasional sharp gas pains and bloating following meals, which will subside with a bowel movement. She remains on Linzess. She also complains of difficulty swallowing, indicated pills will become lodged midchest. This can be passed with several sips of liquid. She is extremely concerned regarding the potential for underlying esophageal cancer. She does admit to a great deal of stress recently. She avoids NSAIDs.

## 2023-03-13 ENCOUNTER — TELEPHONE ENCOUNTER (OUTPATIENT)
Dept: URBAN - METROPOLITAN AREA CLINIC 35 | Facility: CLINIC | Age: 58
End: 2023-03-13

## 2023-03-13 ENCOUNTER — LAB OUTSIDE AN ENCOUNTER (OUTPATIENT)
Dept: URBAN - METROPOLITAN AREA CLINIC 113 | Facility: CLINIC | Age: 58
End: 2023-03-13

## 2023-03-13 ENCOUNTER — OFFICE VISIT (OUTPATIENT)
Dept: URBAN - METROPOLITAN AREA CLINIC 113 | Facility: CLINIC | Age: 58
End: 2023-03-13
Payer: COMMERCIAL

## 2023-03-13 ENCOUNTER — DASHBOARD ENCOUNTERS (OUTPATIENT)
Age: 58
End: 2023-03-13

## 2023-03-13 VITALS
TEMPERATURE: 97.3 F | DIASTOLIC BLOOD PRESSURE: 74 MMHG | BODY MASS INDEX: 23.13 KG/M2 | WEIGHT: 170.8 LBS | HEART RATE: 70 BPM | HEIGHT: 72 IN | RESPIRATION RATE: 20 BRPM | SYSTOLIC BLOOD PRESSURE: 116 MMHG

## 2023-03-13 DIAGNOSIS — K58.1 IRRITABLE BOWEL SYNDROME WITH CONSTIPATION: ICD-10-CM

## 2023-03-13 DIAGNOSIS — K21.9 GASTROESOPHAGEAL REFLUX DISEASE WITHOUT ESOPHAGITIS: ICD-10-CM

## 2023-03-13 DIAGNOSIS — K58.9 IBS (IRRITABLE BOWEL SYNDROME): ICD-10-CM

## 2023-03-13 DIAGNOSIS — K22.4 ESOPHAGEAL DYSMOTILITY: ICD-10-CM

## 2023-03-13 DIAGNOSIS — K59.01 SLOW TRANSIT CONSTIPATION: ICD-10-CM

## 2023-03-13 DIAGNOSIS — E61.1 IRON DEFICIENCY: ICD-10-CM

## 2023-03-13 DIAGNOSIS — R13.10 DYSPHAGIA: ICD-10-CM

## 2023-03-13 PROCEDURE — 99214 OFFICE O/P EST MOD 30 MIN: CPT | Performed by: INTERNAL MEDICINE

## 2023-03-13 RX ORDER — LANOLIN ALCOHOL/MO/W.PET/CERES
1 TABLET CREAM (GRAM) TOPICAL ONCE A DAY
Status: ACTIVE | COMMUNITY

## 2023-03-13 RX ORDER — GLUCOSAMINE/CHONDR SU A SOD 750-600 MG
1 CAPSULE TABLET ORAL ONCE A DAY
Status: ACTIVE | COMMUNITY

## 2023-03-13 RX ORDER — PAROXETINE HYDROCHLORIDE 40 MG/1
1 TABLET IN THE MORNING TABLET, FILM COATED ORAL ONCE A DAY
Status: ACTIVE | COMMUNITY

## 2023-03-13 RX ORDER — GABAPENTIN 400 MG/1
1 TABLET CAPSULE ORAL ONCE A DAY
Status: ACTIVE | COMMUNITY

## 2023-03-13 RX ORDER — POLYETHYLENE GLYCOL 3350 17 G/17G
AS DIRECTED POWDER, FOR SOLUTION ORAL ONCE A DAY
Status: ACTIVE | COMMUNITY

## 2023-03-13 RX ORDER — ENEMA 19; 7 G/133ML; G/133ML
AS DIRECTED ENEMA RECTAL
Status: ACTIVE | COMMUNITY

## 2023-03-13 RX ORDER — ALPRAZOLAM 2 MG/1
1 TABLET TABLET ORAL TWICE A DAY
Status: ACTIVE | COMMUNITY

## 2023-03-13 RX ORDER — PANTOPRAZOLE SODIUM 40 MG/1
1 TABLET TABLET, DELAYED RELEASE ORAL TWICE DAILY
Qty: 180 | Refills: 3 | OUTPATIENT
Start: 2023-03-13

## 2023-03-13 RX ORDER — ASCORBIC ACID 125 MG
AS DIRECTED TABLET,CHEWABLE ORAL
Status: ACTIVE | COMMUNITY

## 2023-03-13 RX ORDER — CALCIUM CARBONATE 260MG(650)
1 LOZENGE AS NEEDED TABLET,CHEWABLE ORAL
Status: ACTIVE | COMMUNITY

## 2023-03-13 RX ORDER — LORATADINE 10 MG/1
1 TABLET TABLET ORAL ONCE A DAY
Status: ACTIVE | COMMUNITY

## 2023-03-13 RX ORDER — SUCRALFATE 1 G/1
1 TABLET ON AN EMPTY STOMACH TABLET ORAL
Qty: 60 | Refills: 6 | Status: ACTIVE | COMMUNITY
Start: 2023-02-17 | End: 2023-09-15

## 2023-03-13 RX ORDER — MIRABEGRON 50 MG/1
1 TABLET TABLET, FILM COATED, EXTENDED RELEASE ORAL ONCE A DAY
Status: ACTIVE | COMMUNITY

## 2023-03-13 RX ORDER — FERROUS SULFATE 325(65) MG
1 TABLET TABLET ORAL ONCE A DAY
Status: ACTIVE | COMMUNITY

## 2023-03-13 RX ORDER — THYROID 60 MG/1
1 TABLET ON AN EMPTY STOMACH TABLET ORAL ONCE A DAY
Status: ACTIVE | COMMUNITY

## 2023-03-13 RX ORDER — PANTOPRAZOLE SODIUM 40 MG/1
1 TABLET TABLET, DELAYED RELEASE ORAL TWICE DAILY
Qty: 180 TABLET | Refills: 3 | Status: ACTIVE | COMMUNITY
Start: 2023-02-22

## 2023-03-13 RX ORDER — FLAXSEED OIL 1000 MG
1 TABLET WITH A MEAL CAPSULE ORAL ONCE A DAY
Status: ACTIVE | COMMUNITY

## 2023-03-13 RX ORDER — PANTOPRAZOLE SODIUM 40 MG/1
1 TABLET TABLET, DELAYED RELEASE ORAL ONCE A DAY
Qty: 90 | Refills: 3 | Status: ACTIVE | COMMUNITY
Start: 2021-12-27

## 2023-03-13 RX ORDER — LINACLOTIDE 290 UG/1
1 CAPSULE AT LEAST 30 MINUTES BEFORE THE FIRST MEAL OF THE DAY ON AN EMPTY STOMACH CAPSULE, GELATIN COATED ORAL ONCE A DAY
Qty: 90 | Refills: 3 | Status: ACTIVE | COMMUNITY
End: 2023-11-30

## 2023-03-13 RX ORDER — VITAMIN A 10000 UNIT
1 TABLET TABLET ORAL ONCE A DAY
Status: ACTIVE | COMMUNITY

## 2023-03-13 RX ORDER — GABAPENTIN 100 MG/1
1 CAPSULE CAPSULE ORAL ONCE A DAY
Status: ACTIVE | COMMUNITY

## 2023-03-13 RX ORDER — FAMOTIDINE 40 MG/1
1 TABLET AT BEDTIME TABLET, FILM COATED ORAL ONCE A DAY
Qty: 90 | Refills: 3 | Status: ACTIVE | COMMUNITY
Start: 2021-03-31

## 2023-03-13 NOTE — HPI-OTHER HISTORIES
Barium swallow March 2023.  Distal esophageal spasm noted with mild residual barium notable in the distal esophagus.  No reflux was identified.  Otherwise negative upper GI series. . She has seen by multiple gastroenterologists in [place] and in our area.  Previously she has seen Dr. James.  She also saw Dr. Mcmahan and also saw Dr. Phillips.  She also has a history of esophageal spasm.  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  She has interstitial cystitis.   . Colonoscopy September 2022. 5 mm ascending colon polyp resected. Markedly redundant colon with excessive looping. No diverticulosis noted. Random biopsies were obtained. The colon polyp was a benign adenoma. Random biopsies were negative for significant colitis. Prior colonoscopies were notable for adenomatous polyps.   . CT Abd and Pelvis with IV contrast July 12, 2022.  Constipation otherwise normal.  . Repeat HP breath test negative 12/2021. . She was seen in the ER in Nov 2021. She was prescribed Prevpak. She took this in November. (2nd course) . CT Abd and Pelvis with contrast June 2021.  . HP breath Test May 2021 negative . CT Abd and Pelvis May 2021 Normal.  . EGD March 31st 2021.  Small hiatal hernia and patulous lower esophageal sphincter.  Mild nonerosive gastritis.  Biopsies were positive for H. pylori. . Barium swallow March 2021.  Consistent with esophageal dysmotility. . EGD 20 years ago.  Reportedly notable for H. pylori on biopsies.  She was treated with quadruple therapy at that time  . On 2 different occasions in the past celiac testing was negative serologically.   . Esophageal manometry 2017: Reportedly Negative for spasm. But then she states she had Nutcracker esophagus.

## 2023-03-13 NOTE — HPI-TODAY'S VISIT:
57-year-old female presents with a history of gastroesophageal reflux disease, functional dyspepsia, constipation predominant IBS, severe anxiety, and depression, presenting for evaluation of heartburn. . She has a history of GERD, severe functional dyspepsia, esophageal spasm, and severe irritable bowel syndrome. Her symptoms are managed with Linzess, pantoprazole, famotidine, and nitroglycerin when needed. She has severe anxiety and depression. . She has severe IBS symptoms.  She states that she had severe right lower quadrant abdominal pain last week.  Also some upper abdominal pain as well.  She has heartburn and reflux.  She is up to twice daily pantoprazole taking famotidine as well.  Also taking Carafate.  The Carafate has been somewhat helpful. No dysphagia at this time.  Weight is stable.  Admits that she was eating some peach cobbler last week and this resulted in some worsening of her symptoms.  She went to get some allergy testing and she was told that she was allergic to soy and shellfish.  She will avoid taking these foods at this time.  She continues to see her psychiatrist in [facility]. . She avoids NSAIDs. . Psychiatric care: She sees ZAKI Hernandez who works with [facility] in [place], [place].

## 2023-03-30 ENCOUNTER — TELEPHONE ENCOUNTER (OUTPATIENT)
Dept: URBAN - METROPOLITAN AREA CLINIC 113 | Facility: CLINIC | Age: 58
End: 2023-03-30

## 2023-03-30 RX ORDER — LINACLOTIDE 145 UG/1
1 CAPSULE AT LEAST 30 MINUTES BEFORE THE FIRST MEAL OF THE DAY ON AN EMPTY STOMACH CAPSULE, GELATIN COATED ORAL ONCE A DAY
Qty: 90 | Refills: 3 | OUTPATIENT
Start: 2023-03-30 | End: 2024-03-24

## 2023-04-05 ENCOUNTER — TELEPHONE ENCOUNTER (OUTPATIENT)
Dept: URBAN - METROPOLITAN AREA CLINIC 113 | Facility: CLINIC | Age: 58
End: 2023-04-05

## 2023-04-05 RX ORDER — LINACLOTIDE 145 UG/1
1 CAPSULE AT LEAST 30 MINUTES BEFORE THE FIRST MEAL OF THE DAY ON AN EMPTY STOMACH CAPSULE, GELATIN COATED ORAL ONCE A DAY
Qty: 90 | Refills: 3
Start: 2023-03-30 | End: 2024-03-30

## 2023-04-10 ENCOUNTER — TELEPHONE ENCOUNTER (OUTPATIENT)
Dept: URBAN - METROPOLITAN AREA CLINIC 113 | Facility: CLINIC | Age: 58
End: 2023-04-10

## 2023-04-13 ENCOUNTER — TELEPHONE ENCOUNTER (OUTPATIENT)
Dept: URBAN - METROPOLITAN AREA CLINIC 113 | Facility: CLINIC | Age: 58
End: 2023-04-13

## 2023-04-13 RX ORDER — PANTOPRAZOLE SODIUM 40 MG/1
1 TABLET TABLET, DELAYED RELEASE ORAL TWICE DAILY
Qty: 180 | Refills: 3
Start: 2023-03-13

## 2023-06-20 ENCOUNTER — OFFICE VISIT (OUTPATIENT)
Dept: URBAN - METROPOLITAN AREA CLINIC 113 | Facility: CLINIC | Age: 58
End: 2023-06-20

## 2025-04-28 NOTE — PHYSICAL EXAM PSYCH:
Franck Song 38 year old female 1987    Chief Complaint   Patient presents with    Office Visit    Pain     joints       Consultation Note:   Requesting Provider: Krista Holley DO   Reason for Consultation: Systemic Lupus Erythematosus.     HISTORY OF PRESENT ILLNESS:  The patient is a 38 year old female who presents today with a history of Systemic Lupus Erythematosus and Sjogren's syndrome with inflammatory arthritis.  In 2022/2023, she started having migratory inflamed joints mainly involving the bilateral proximal interphalangeal (PIP) joints, metacarpophalangeal (MCP) joints, wrists,  and elbows Also found to have a high positive antinuclear antibody in a homogenous pattern, leukopenia, and lymphopenia. Started on Plaquenil in June, 2023 with resolution of symptoms.  In addtion, she history of two miscarriages, and has three healthy children. No history of a blood clots. Denies painless oral and nasal ulcers, chest pain, dry eyes and xerostomia.  Does have a history of pleurisy but this has not occurred an a while.      Today, her pain is most severe in the right lateral hip secondary to greater trochanteric pain syndrome per MRI. Status post cortisone injection per orthopedics and  in physical therapy. Offers no other complaints.       REVIEW OF SYSTEMS:  Positive change in activity capacity, headaches, ankle swelling, dandruff, oily skin, reflux, heartburn, arthritis, bursitis, joint aches, tendinitis, muscles, miscarriages. No fevers, no oral ulcers, no alopecia, no Raynaud phenomenon, no eye inflammation, no visual changes, no rashes, no chest pain, no pleurisy, no shortness of breath, no dyspnea on exertion, no hematuria, no melena, no bright red blood per rectum, no nausea, no vomiting, no dysphagia, no bleeding, no seizures.  All other systems reviewed and negative.      Past Medical History:   Diagnosis Date    GERD (gastroesophageal reflux disease)     Herpes     History of headache      normal mood with appropriate affect Migraine     RAD (reactive airway disease) (CMD)         Past Surgical History:   Procedure Laterality Date    Remv/revisn shldr/hip spica cast Right 2015    Rotator cuff repair Right 2015    Wrist surgery Right 2016    right       Current Outpatient Medications   Medication Sig Dispense Refill    naproxen (NAPROSYN) 500 MG tablet Take 500 mg by mouth 2 times daily as needed.      meloxicam (MOBIC) 7.5 MG tablet TAKE 1 TABLET BY MOUTH TWICE DAILY AS NEEDED FOR PAIN 60 tablet 0    metFORMIN (GLUCOPHAGE-XR) 500 MG 24 hr tablet Take 500 mg by mouth. Take 1 tablet in the morning and 1 tablet at night      hydroxychloroquine (PLAQUENIL) 200 MG tablet       Valacyclovir HCl 1000 MG Tab Take 500 mg by mouth as needed.      medroxyPROGESTERone (DEPO-PROVERA) 150 MG/ML injectable suspension Inject 150 mg into the muscle every 3 months.      amitriptyline (ELAVIL) 10 MG tablet Take 30 mg by mouth daily.      triamcinolone (ARISTOCORT) 0.1 % ointment APPLY TOPICALLY TO THE AFFECTED AREA ON TRUNK AND ARMS TWICE DAILY      betamethasone valerate (VALISONE) 0.1 % ointment       budesonide (PULMICORT) 0.5 MG/2ML nebulizer suspension Inhale 0.5 mg into the lungs daily.      acetaminophen (TYLENOL) 500 MG tablet Take 500-1,000 mg by mouth every 6 hours as needed.      albuterol (VENTOLIN) (2.5 MG/3ML) 0.083% nebulizer solution Inhale 2.5 mg into the lungs 4 times daily.      SUMAtriptan (IMITREX) 25 MG tablet Take 1 tablet by mouth at onset of migraine. May repeat after 2 hours if needed. 10 tablet 2    topiramate (TOPAMAX) 25 MG tablet Take 25 mg by mouth 2 times daily.       No current facility-administered medications for this visit.        ALLERGIES:   Allergen Reactions    Penicillins HIVES        Social History     Tobacco Use    Smoking status: Former     Current packs/day: 0.50     Types: Cigarettes    Smokeless tobacco: Current   Substance Use Topics    Alcohol use: No    Drug use: No       Family History   Problem Relation  Age of Onset    Diabetes Sister         dx age 11    Thyroid Sister     Hypertension Maternal Aunt     Hypertension Maternal Uncle     Diabetes Maternal Grandmother     Diabetes Maternal Grandfather     Diabetes Paternal Grandmother     Diabetes Paternal Grandfather     Other Mother         arthritis unknown    Hypertension Father              PHYSICAL EXAMINATION:  VITAL SIGNS:   Vitals:    04/28/25 0857   BP: 100/60   Pulse: 70   Resp: 16   SpO2: 99%   Weight: 78 kg (172 lb)     GENERAL:  Well dressed and well developed.  HEENT:  Normocephalic, atraumatic.  Normal appearing sclerae and  conjunctivae.  Pupils equal, round, reactive to light and accommodation.  Oropharynx clear.  Nasal mucosa and lips without ulcerations.  NECK:  Supple and nontender.  No cervical or supraclavicular  lymphadenopathy.  CARDIOVASCULAR:  Regular rate and rhythm.  Normal S1, S2.  No murmurs or  rubs.  LUNGS:  Clear to auscultation.  No rales or wheezing. Breathing is  unlabored.  EXTREMITIES:  No edema, cyanosis, or clubbing.  SKIN:  No rashes, digital ulcers, periungual erythema, nail pitting,  nodules, or sclerodactyly.  MUSCULOSKELETAL:  Bilateral upper extremities:  No synovitis.  Normal  range of motion.  Normal strength.    Bilateral lower extremities:  No synovitis. Tenderness over the right greater trochanteric bursa.  Normal strength.    SPINE:  No tenderness, normal range of motion.  NEUROLOGICAL:  Sensation intact.  .  Cranial nerves 2-12 intact.  PSYCHOLOGICAL: Alert and oriented x3.  Mood and affect are normal.    LABORATORY AND DIAGNOSTIC DATA:  Reviewed in Epic to date.     ASSESSMENT AND PLAN:  This is a 38 year old female with:    1) Systemic Lupus Erythematosus (high positive antinuclear antibody in a homogenous pattern, inflammatory arthritis, leukopenia, lymphopenia)   -   Obtain dsDNA, complement 3, complement 4, c-reactive protein, cbc, cmp and urinalysis every 3 months   -   Continue Plaquenil 200 mg 2 times daily      2) Long-term use of Plaquenil.   -   The patient will obtain an annual eye exam with OCT (optical coherence tomography) testing   -  Obtain cbc and cmp every 3 months.     3) Two miscarriages with history of lupus. Obtain antiphospholipid antibody panel.     4) Greater trochanteric pain syndrome, right. Continue physical therapy.     Follow up in 3 months.     Dr. Krista Holley,  thank you very much for allowing me to participate in this patient's treatment. Please feel free to call me if you have any questions.    On 4/28/2025, IPhoebe scribed the services personally performed by Avelino West, DO    The documentation recorded by the scribe accurately and completely reflects the service(s) I personally performed and the decisions made by me.